# Patient Record
Sex: FEMALE | Race: ASIAN
[De-identification: names, ages, dates, MRNs, and addresses within clinical notes are randomized per-mention and may not be internally consistent; named-entity substitution may affect disease eponyms.]

---

## 2021-09-27 PROBLEM — Z00.00 ENCOUNTER FOR PREVENTIVE HEALTH EXAMINATION: Status: ACTIVE | Noted: 2021-09-27

## 2021-10-14 ENCOUNTER — APPOINTMENT (OUTPATIENT)
Dept: OBGYN | Facility: CLINIC | Age: 26
End: 2021-10-14
Payer: COMMERCIAL

## 2021-10-14 VITALS
SYSTOLIC BLOOD PRESSURE: 115 MMHG | OXYGEN SATURATION: 98 % | WEIGHT: 140 LBS | HEIGHT: 62 IN | HEART RATE: 81 BPM | TEMPERATURE: 97.9 F | BODY MASS INDEX: 25.76 KG/M2 | DIASTOLIC BLOOD PRESSURE: 71 MMHG

## 2021-10-14 DIAGNOSIS — Z78.9 OTHER SPECIFIED HEALTH STATUS: ICD-10-CM

## 2021-10-14 DIAGNOSIS — R10.2 PELVIC AND PERINEAL PAIN: ICD-10-CM

## 2021-10-14 DIAGNOSIS — N89.8 OTHER SPECIFIED NONINFLAMMATORY DISORDERS OF VAGINA: ICD-10-CM

## 2021-10-14 DIAGNOSIS — Z83.3 FAMILY HISTORY OF DIABETES MELLITUS: ICD-10-CM

## 2021-10-14 PROCEDURE — 99204 OFFICE O/P NEW MOD 45 MIN: CPT

## 2021-10-14 RX ORDER — DOXYCYCLINE HYCLATE 100 MG/1
100 TABLET ORAL
Qty: 14 | Refills: 0 | Status: ACTIVE | COMMUNITY
Start: 2021-10-14 | End: 1900-01-01

## 2021-10-14 NOTE — HISTORY OF PRESENT ILLNESS
[Normal Amount/Duration] :  normal amount and duration [Frequency: Q ___ days] : menstrual periods occur approximately every [unfilled] days [Menarche Age: ____] : age at menarche was [unfilled] [Menstrual Cramps] : menstrual cramps [FreeTextEntry1] : 10/14/2021 [Currently Active] : currently active [Men] : men [Vaginal] : vaginal [No] : No

## 2021-10-14 NOTE — PHYSICAL EXAM
[Appropriately responsive] : appropriately responsive [Alert] : alert [No Acute Distress] : no acute distress [No Lymphadenopathy] : no lymphadenopathy [Regular Rate Rhythm] : regular rate rhythm [No Murmurs] : no murmurs [Clear to Auscultation B/L] : clear to auscultation bilaterally [Soft] : soft [Non-tender] : non-tender [Non-distended] : non-distended [No HSM] : No HSM [No Lesions] : no lesions [No Mass] : no mass [Oriented x3] : oriented x3 [Examination Of The Breasts] : a normal appearance [No Masses] : no breast masses were palpable [Labia Majora] : normal [Labia Minora] : normal [Discharge] : discharge [Moderate] : moderate [Foul Smelling] : foul smelling [White] : white [Thick] : thick [Blood-Tinged] : blood-tinged [Normal] : normal [Uterine Adnexae] : normal

## 2021-10-15 LAB
APPEARANCE: CLEAR
BACTERIA: NEGATIVE
BASOPHILS # BLD AUTO: 0.02 K/UL
BASOPHILS NFR BLD AUTO: 0.3 %
BILIRUBIN URINE: NEGATIVE
BLOOD URINE: NEGATIVE
C TRACH RRNA SPEC QL NAA+PROBE: NOT DETECTED
COLOR: COLORLESS
EOSINOPHIL # BLD AUTO: 0.05 K/UL
EOSINOPHIL NFR BLD AUTO: 0.7 %
GLUCOSE QUALITATIVE U: NEGATIVE
HCT VFR BLD CALC: 38.6 %
HGB BLD-MCNC: 12.2 G/DL
HIV1+2 AB SPEC QL IA.RAPID: NONREACTIVE
HSV 1+2 IGG SER IA-IMP: NEGATIVE
HSV 1+2 IGG SER IA-IMP: POSITIVE
HSV1 IGG SER QL: 55.2 INDEX
HSV2 IGG SER QL: 0.09 INDEX
HYALINE CASTS: 0 /LPF
IMM GRANULOCYTES NFR BLD AUTO: 0.3 %
KETONES URINE: NEGATIVE
LEUKOCYTE ESTERASE URINE: NEGATIVE
LYMPHOCYTES # BLD AUTO: 2.56 K/UL
LYMPHOCYTES NFR BLD AUTO: 35.5 %
MAN DIFF?: NORMAL
MCHC RBC-ENTMCNC: 28 PG
MCHC RBC-ENTMCNC: 31.6 GM/DL
MCV RBC AUTO: 88.5 FL
MICROSCOPIC-UA: NORMAL
MONOCYTES # BLD AUTO: 0.28 K/UL
MONOCYTES NFR BLD AUTO: 3.9 %
N GONORRHOEA RRNA SPEC QL NAA+PROBE: NOT DETECTED
NEUTROPHILS # BLD AUTO: 4.29 K/UL
NEUTROPHILS NFR BLD AUTO: 59.3 %
NITRITE URINE: NEGATIVE
PH URINE: 6.5
PLATELET # BLD AUTO: 263 K/UL
PROTEIN URINE: NEGATIVE
RBC # BLD: 4.36 M/UL
RBC # FLD: 14.1 %
RED BLOOD CELLS URINE: 2 /HPF
SOURCE TP AMPLIFICATION: NORMAL
SPECIFIC GRAVITY URINE: 1.01
SQUAMOUS EPITHELIAL CELLS: 1 /HPF
T PALLIDUM AB SER QL IA: NEGATIVE
UROBILINOGEN URINE: NORMAL
WBC # FLD AUTO: 7.22 K/UL
WHITE BLOOD CELLS URINE: 0 /HPF

## 2021-10-16 LAB
BACTERIA UR CULT: NORMAL
CANDIDA VAG CYTO: NOT DETECTED
G VAGINALIS+PREV SP MTYP VAG QL MICRO: NOT DETECTED
T VAGINALIS VAG QL WET PREP: NOT DETECTED

## 2021-10-18 RX ORDER — ERGOCALCIFEROL 1.25 MG/1
1.25 MG CAPSULE, LIQUID FILLED ORAL
Qty: 4 | Refills: 0 | Status: ACTIVE | COMMUNITY
Start: 2021-09-24

## 2021-10-18 RX ORDER — CHLORHEXIDINE GLUCONATE, 0.12% ORAL RINSE 1.2 MG/ML
0.12 SOLUTION DENTAL
Qty: 473 | Refills: 0 | Status: COMPLETED | COMMUNITY
Start: 2021-05-31

## 2021-10-18 RX ORDER — IBUPROFEN 600 MG/1
600 TABLET, FILM COATED ORAL
Qty: 28 | Refills: 0 | Status: COMPLETED | COMMUNITY
Start: 2021-05-31

## 2021-10-18 RX ORDER — AMOXICILLIN 500 MG/1
500 CAPSULE ORAL
Qty: 21 | Refills: 0 | Status: COMPLETED | COMMUNITY
Start: 2021-05-31

## 2021-10-19 DIAGNOSIS — B37.3 CANDIDIASIS OF VULVA AND VAGINA: ICD-10-CM

## 2021-10-19 LAB — CYTOLOGY CVX/VAG DOC THIN PREP: ABNORMAL

## 2021-10-19 RX ORDER — FLUCONAZOLE 150 MG/1
150 TABLET ORAL
Qty: 2 | Refills: 1 | Status: ACTIVE | COMMUNITY
Start: 2021-10-19 | End: 1900-01-01

## 2021-10-21 ENCOUNTER — APPOINTMENT (OUTPATIENT)
Dept: OBGYN | Facility: CLINIC | Age: 26
End: 2021-10-21

## 2024-04-25 ENCOUNTER — APPOINTMENT (OUTPATIENT)
Dept: ANTEPARTUM | Facility: CLINIC | Age: 29
End: 2024-04-25
Payer: COMMERCIAL

## 2024-04-25 ENCOUNTER — TRANSCRIPTION ENCOUNTER (OUTPATIENT)
Age: 29
End: 2024-04-25

## 2024-04-25 ENCOUNTER — ASOB RESULT (OUTPATIENT)
Age: 29
End: 2024-04-25

## 2024-04-25 PROCEDURE — 76819 FETAL BIOPHYS PROFIL W/O NST: CPT

## 2024-04-25 PROCEDURE — 76820 UMBILICAL ARTERY ECHO: CPT

## 2024-04-25 PROCEDURE — 99204 OFFICE O/P NEW MOD 45 MIN: CPT | Mod: 25

## 2024-04-25 PROCEDURE — 76805 OB US >/= 14 WKS SNGL FETUS: CPT

## 2024-05-02 ENCOUNTER — APPOINTMENT (OUTPATIENT)
Dept: ANTEPARTUM | Facility: CLINIC | Age: 29
End: 2024-05-02
Payer: COMMERCIAL

## 2024-05-02 ENCOUNTER — ASOB RESULT (OUTPATIENT)
Age: 29
End: 2024-05-02

## 2024-05-02 PROCEDURE — 99212 OFFICE O/P EST SF 10 MIN: CPT | Mod: 25

## 2024-05-02 PROCEDURE — 76820 UMBILICAL ARTERY ECHO: CPT

## 2024-05-02 PROCEDURE — 76819 FETAL BIOPHYS PROFIL W/O NST: CPT

## 2024-05-03 ENCOUNTER — APPOINTMENT (OUTPATIENT)
Dept: MATERNAL FETAL MEDICINE | Facility: CLINIC | Age: 29
End: 2024-05-03
Payer: COMMERCIAL

## 2024-05-03 ENCOUNTER — ASOB RESULT (OUTPATIENT)
Age: 29
End: 2024-05-03

## 2024-05-03 DIAGNOSIS — O24.419 GESTATIONAL DIABETES MELLITUS IN PREGNANCY, UNSPECIFIED CONTROL: ICD-10-CM

## 2024-05-03 PROCEDURE — G0109 DIAB MANAGE TRN IND/GROUP: CPT | Mod: 95

## 2024-05-03 RX ORDER — INSULIN HUMAN 100 [IU]/ML
100 INJECTION, SUSPENSION SUBCUTANEOUS
Qty: 1 | Refills: 2 | Status: ACTIVE | COMMUNITY
Start: 2024-05-03 | End: 1900-01-01

## 2024-05-03 RX ORDER — URINE ACETONE TEST STRIPS
STRIP MISCELLANEOUS
Qty: 1 | Refills: 2 | Status: ACTIVE | COMMUNITY
Start: 2024-05-03 | End: 1900-01-01

## 2024-05-03 RX ORDER — ISOPROPYL ALCOHOL 0.7 ML/ML
SWAB TOPICAL
Qty: 1 | Refills: 2 | Status: ACTIVE | COMMUNITY
Start: 2024-05-03 | End: 1900-01-01

## 2024-05-03 RX ORDER — PEN NEEDLE, DIABETIC 32GX 5/32"
32G X 4 MM NEEDLE, DISPOSABLE MISCELLANEOUS
Qty: 1 | Refills: 0 | Status: ACTIVE | COMMUNITY
Start: 2024-05-03 | End: 1900-01-01

## 2024-05-09 ENCOUNTER — APPOINTMENT (OUTPATIENT)
Dept: ANTEPARTUM | Facility: CLINIC | Age: 29
End: 2024-05-09
Payer: COMMERCIAL

## 2024-05-09 ENCOUNTER — ASOB RESULT (OUTPATIENT)
Age: 29
End: 2024-05-09

## 2024-05-09 PROCEDURE — 76820 UMBILICAL ARTERY ECHO: CPT

## 2024-05-09 PROCEDURE — 76819 FETAL BIOPHYS PROFIL W/O NST: CPT

## 2024-05-13 ENCOUNTER — APPOINTMENT (OUTPATIENT)
Dept: MATERNAL FETAL MEDICINE | Facility: CLINIC | Age: 29
End: 2024-05-13
Payer: COMMERCIAL

## 2024-05-13 ENCOUNTER — ASOB RESULT (OUTPATIENT)
Age: 29
End: 2024-05-13

## 2024-05-13 PROCEDURE — G0108 DIAB MANAGE TRN  PER INDIV: CPT | Mod: 95

## 2024-05-13 RX ORDER — LANCETS 33 GAUGE
EACH MISCELLANEOUS
Qty: 4 | Refills: 2 | Status: ACTIVE | COMMUNITY
Start: 2024-05-13 | End: 1900-01-01

## 2024-05-13 RX ORDER — BLOOD-GLUCOSE METER
KIT MISCELLANEOUS
Qty: 2 | Refills: 2 | Status: ACTIVE | COMMUNITY
Start: 2024-05-13 | End: 1900-01-01

## 2024-05-16 ENCOUNTER — ASOB RESULT (OUTPATIENT)
Age: 29
End: 2024-05-16

## 2024-05-16 ENCOUNTER — APPOINTMENT (OUTPATIENT)
Dept: ANTEPARTUM | Facility: CLINIC | Age: 29
End: 2024-05-16

## 2024-05-16 ENCOUNTER — APPOINTMENT (OUTPATIENT)
Dept: ANTEPARTUM | Facility: CLINIC | Age: 29
End: 2024-05-16
Payer: COMMERCIAL

## 2024-05-16 ENCOUNTER — OUTPATIENT (OUTPATIENT)
Dept: INPATIENT UNIT | Facility: HOSPITAL | Age: 29
LOS: 1 days | Discharge: HOME | End: 2024-05-16
Payer: COMMERCIAL

## 2024-05-16 VITALS — SYSTOLIC BLOOD PRESSURE: 124 MMHG | HEART RATE: 70 BPM | DIASTOLIC BLOOD PRESSURE: 62 MMHG

## 2024-05-16 VITALS
SYSTOLIC BLOOD PRESSURE: 134 MMHG | DIASTOLIC BLOOD PRESSURE: 60 MMHG | HEART RATE: 86 BPM | TEMPERATURE: 98 F | RESPIRATION RATE: 16 BRPM

## 2024-05-16 DIAGNOSIS — O26.899 OTHER SPECIFIED PREGNANCY RELATED CONDITIONS, UNSPECIFIED TRIMESTER: ICD-10-CM

## 2024-05-16 PROCEDURE — 99212 OFFICE O/P EST SF 10 MIN: CPT | Mod: 25

## 2024-05-16 PROCEDURE — 76818 FETAL BIOPHYS PROFILE W/NST: CPT | Mod: 59

## 2024-05-16 PROCEDURE — 59025 FETAL NON-STRESS TEST: CPT | Mod: 26

## 2024-05-16 PROCEDURE — 99221 1ST HOSP IP/OBS SF/LOW 40: CPT | Mod: 25

## 2024-05-16 PROCEDURE — 76816 OB US FOLLOW-UP PER FETUS: CPT

## 2024-05-16 NOTE — OB PROVIDER TRIAGE NOTE - HISTORY OF PRESENT ILLNESS
28 year old female P0 at 34.2 week gestationGDMA2   who was sent from ATU for NR NST   followed for Fetal growth EFW 3%      [facility]     followed for FGR   EFW 3%  4.7# on today scan   BPP 8/  28 year old female P0 at 34.2 week gestation GDMA2   who was sent from ATU for NR NST         [facility]     followed for FGR   EFW 3%  4.7# on today scan   BPP 8/

## 2024-05-16 NOTE — OB PROVIDER TRIAGE NOTE - NSOBPROVIDERNOTE_OBGYN_ALL_OB_FT
28 year old female  NRNST at ATU    [facility]   [facility]   NST x 2 hours    cleared for discharge    instructions  verbal and written given to patient     - Discussed with Dr. Laureano   - Patient to be discharged home with follow up and return precautions  - Please follow up with your obstetrician at your next scheduled appointment.   - Please return for decreased/no fetal movement, vaginal bleeding similar to that of a period, leaking/gush of fluid, regular contractions occurring 4-5 minutes for one hour or requiring pain medication   - Patient and partner and educated of plan and demonstrate understanding. All questions answered. Discharge instructions provided and signed.   - Discharged at ___[id number]

## 2024-05-16 NOTE — OB PROVIDER TRIAGE NOTE - NSHPPHYSICALEXAM_GEN_ALL_CORE
pt seen and examined   ICU Vital Signs Last 24 Hrs  T(C): 36.7 (16 May 2024 11:01), Max: 36.7 (16 May 2024 10:43)  T(F): 98.06 (16 May 2024 11:01), Max: 98.1 (16 May 2024 10:43)  HR: 70 (16 May 2024 13:33) (70 - 86)  BP: 124/62 (16 May 2024 13:33) (108/57 - 134/60)  BP(mean): --  ABP: --  ABP(mean): --  RR: 16 (16 May 2024 11:01) (16 - 16)  SpO2: --    pt in NAD      lungs clear    heart s1 s2   abd  soft gravid  non tender   placed on EFM      NST reactive  in traige     scan at [facility]   FGR  EFW 4.7#  3%  AAFI 7.7 pt seen and examined   ICU Vital Signs Last 24 Hrs  T(C): 36.7 (16 May 2024 11:01), Max: 36.7 (16 May 2024 10:43)  T(F): 98.06 (16 May 2024 11:01), Max: 98.1 (16 May 2024 10:43)  HR: 70 (16 May 2024 13:33) (70 - 86)  BP: 124/62 (16 May 2024 13:33) (108/57 - 134/60)  BP(mean): --  ABP: --  ABP(mean): --  RR: 16 (16 May 2024 11:01) (16 - 16)  SpO2: --    pt in NAD      lungs clear    heart s1 s2   abd  soft gravid  non tender   placed on EFM      NST reactive  in traige     scan at ATU   today   BREECH   FGR  EFW 4.7#  3%  AAFI 7.7

## 2024-05-17 DIAGNOSIS — O24.415 GESTATIONAL DIABETES MELLITUS IN PREGNANCY, CONTROLLED BY ORAL HYPOGLYCEMIC DRUGS: ICD-10-CM

## 2024-05-17 DIAGNOSIS — Z3A.34 34 WEEKS GESTATION OF PREGNANCY: ICD-10-CM

## 2024-05-17 DIAGNOSIS — O36.5930 MATERNAL CARE FOR OTHER KNOWN OR SUSPECTED POOR FETAL GROWTH, THIRD TRIMESTER, NOT APPLICABLE OR UNSPECIFIED: ICD-10-CM

## 2024-05-22 ENCOUNTER — APPOINTMENT (OUTPATIENT)
Dept: MATERNAL FETAL MEDICINE | Facility: CLINIC | Age: 29
End: 2024-05-22
Payer: COMMERCIAL

## 2024-05-22 ENCOUNTER — ASOB RESULT (OUTPATIENT)
Age: 29
End: 2024-05-22

## 2024-05-22 PROCEDURE — G0108 DIAB MANAGE TRN  PER INDIV: CPT | Mod: 95

## 2024-05-23 ENCOUNTER — ASOB RESULT (OUTPATIENT)
Age: 29
End: 2024-05-23

## 2024-05-23 ENCOUNTER — APPOINTMENT (OUTPATIENT)
Dept: ANTEPARTUM | Facility: CLINIC | Age: 29
End: 2024-05-23
Payer: COMMERCIAL

## 2024-05-23 PROCEDURE — 76818 FETAL BIOPHYS PROFILE W/NST: CPT

## 2024-05-23 PROCEDURE — 76820 UMBILICAL ARTERY ECHO: CPT

## 2024-05-30 ENCOUNTER — APPOINTMENT (OUTPATIENT)
Dept: ANTEPARTUM | Facility: CLINIC | Age: 29
End: 2024-05-30

## 2024-05-30 ENCOUNTER — ASOB RESULT (OUTPATIENT)
Age: 29
End: 2024-05-30

## 2024-05-30 PROCEDURE — 76818 FETAL BIOPHYS PROFILE W/NST: CPT

## 2024-05-30 PROCEDURE — 76820 UMBILICAL ARTERY ECHO: CPT | Mod: 59

## 2024-05-30 PROCEDURE — 99212 OFFICE O/P EST SF 10 MIN: CPT | Mod: 25

## 2024-05-30 PROCEDURE — 76816 OB US FOLLOW-UP PER FETUS: CPT

## 2024-06-04 ENCOUNTER — APPOINTMENT (OUTPATIENT)
Dept: ANTEPARTUM | Facility: CLINIC | Age: 29
End: 2024-06-04
Payer: COMMERCIAL

## 2024-06-04 ENCOUNTER — ASOB RESULT (OUTPATIENT)
Age: 29
End: 2024-06-04

## 2024-06-04 PROCEDURE — 76820 UMBILICAL ARTERY ECHO: CPT

## 2024-06-04 PROCEDURE — 76818 FETAL BIOPHYS PROFILE W/NST: CPT

## 2024-06-05 ENCOUNTER — TRANSCRIPTION ENCOUNTER (OUTPATIENT)
Age: 29
End: 2024-06-05

## 2024-06-05 ENCOUNTER — OUTPATIENT (OUTPATIENT)
Dept: OUTPATIENT SERVICES | Facility: HOSPITAL | Age: 29
LOS: 1 days | End: 2024-06-05

## 2024-06-05 VITALS
RESPIRATION RATE: 16 BRPM | SYSTOLIC BLOOD PRESSURE: 96 MMHG | DIASTOLIC BLOOD PRESSURE: 66 MMHG | HEART RATE: 90 BPM | OXYGEN SATURATION: 99 % | TEMPERATURE: 98 F | HEIGHT: 62 IN | WEIGHT: 169.98 LBS

## 2024-06-05 LAB
ANION GAP SERPL CALC-SCNC: 16 MMOL/L — HIGH (ref 7–14)
APPEARANCE UR: CLEAR — SIGNIFICANT CHANGE UP
BILIRUB UR-MCNC: NEGATIVE — SIGNIFICANT CHANGE UP
BLD GP AB SCN SERPL QL: NEGATIVE — SIGNIFICANT CHANGE UP
BUN SERPL-MCNC: 12 MG/DL — SIGNIFICANT CHANGE UP (ref 7–23)
CALCIUM SERPL-MCNC: 9.1 MG/DL — SIGNIFICANT CHANGE UP (ref 8.4–10.5)
CHLORIDE SERPL-SCNC: 104 MMOL/L — SIGNIFICANT CHANGE UP (ref 98–107)
CO2 SERPL-SCNC: 17 MMOL/L — LOW (ref 22–31)
COLOR SPEC: YELLOW — SIGNIFICANT CHANGE UP
CREAT SERPL-MCNC: 0.54 MG/DL — SIGNIFICANT CHANGE UP (ref 0.5–1.3)
DIFF PNL FLD: NEGATIVE — SIGNIFICANT CHANGE UP
EGFR: 130 ML/MIN/1.73M2 — SIGNIFICANT CHANGE UP
GLUCOSE SERPL-MCNC: 74 MG/DL — SIGNIFICANT CHANGE UP (ref 70–99)
GLUCOSE UR QL: NEGATIVE MG/DL — SIGNIFICANT CHANGE UP
HCT VFR BLD CALC: 35.8 % — SIGNIFICANT CHANGE UP (ref 34.5–45)
HGB BLD-MCNC: 11.6 G/DL — SIGNIFICANT CHANGE UP (ref 11.5–15.5)
KETONES UR-MCNC: NEGATIVE MG/DL — SIGNIFICANT CHANGE UP
LEUKOCYTE ESTERASE UR-ACNC: NEGATIVE — SIGNIFICANT CHANGE UP
MCHC RBC-ENTMCNC: 27 PG — SIGNIFICANT CHANGE UP (ref 27–34)
MCHC RBC-ENTMCNC: 32.4 GM/DL — SIGNIFICANT CHANGE UP (ref 32–36)
MCV RBC AUTO: 83.4 FL — SIGNIFICANT CHANGE UP (ref 80–100)
NITRITE UR-MCNC: NEGATIVE — SIGNIFICANT CHANGE UP
NRBC # BLD: 0 /100 WBCS — SIGNIFICANT CHANGE UP (ref 0–0)
NRBC # FLD: 0 K/UL — SIGNIFICANT CHANGE UP (ref 0–0)
PH UR: 6.5 — SIGNIFICANT CHANGE UP (ref 5–8)
PLATELET # BLD AUTO: 179 K/UL — SIGNIFICANT CHANGE UP (ref 150–400)
POTASSIUM SERPL-MCNC: 4.1 MMOL/L — SIGNIFICANT CHANGE UP (ref 3.5–5.3)
POTASSIUM SERPL-SCNC: 4.1 MMOL/L — SIGNIFICANT CHANGE UP (ref 3.5–5.3)
PROT UR-MCNC: NEGATIVE MG/DL — SIGNIFICANT CHANGE UP
RBC # BLD: 4.29 M/UL — SIGNIFICANT CHANGE UP (ref 3.8–5.2)
RBC # FLD: 14.8 % — HIGH (ref 10.3–14.5)
RH IG SCN BLD-IMP: POSITIVE — SIGNIFICANT CHANGE UP
SODIUM SERPL-SCNC: 137 MMOL/L — SIGNIFICANT CHANGE UP (ref 135–145)
SP GR SPEC: 1.01 — SIGNIFICANT CHANGE UP (ref 1–1.03)
UROBILINOGEN FLD QL: 0.2 MG/DL — SIGNIFICANT CHANGE UP (ref 0.2–1)
WBC # BLD: 7.88 K/UL — SIGNIFICANT CHANGE UP (ref 3.8–10.5)
WBC # FLD AUTO: 7.88 K/UL — SIGNIFICANT CHANGE UP (ref 3.8–10.5)

## 2024-06-05 RX ORDER — CHLORHEXIDINE GLUCONATE 213 G/1000ML
1 SOLUTION TOPICAL DAILY
Refills: 0 | Status: DISCONTINUED | OUTPATIENT
Start: 2024-06-06 | End: 2024-06-09

## 2024-06-05 RX ORDER — SODIUM CHLORIDE 9 MG/ML
1000 INJECTION, SOLUTION INTRAVENOUS
Refills: 0 | Status: DISCONTINUED | OUTPATIENT
Start: 2024-06-06 | End: 2024-06-08

## 2024-06-05 RX ORDER — CITRIC ACID/SODIUM CITRATE 300-500 MG
30 SOLUTION, ORAL ORAL ONCE
Refills: 0 | Status: COMPLETED | OUTPATIENT
Start: 2024-06-06 | End: 2024-06-06

## 2024-06-05 NOTE — OB PST NOTE - PROBLEM SELECTOR PLAN 1
Pre-op instructions provided. Pt given verbal and written instructions with teach back on chlorhexidine shampoo, and anesthesia. Pt verbalized understanding with return demonstration.     CBC, BMP (GDM) T/S and UA done at PST.  Pt advised  to follow OB for insulin instruction.  Next day case, discussed to . Patient is scheduled for C section on 6/6/2024.Pre-op instructions provided. Pt given verbal and written instructions with teach back on chlorhexidine shampoo, and anesthesia. Pt verbalized understanding with return demonstration.     CBC, BMP (GDM) T/S and UA done at [facility].  Pt advised  to follow OB for insulin instruction.  Next day case, discussed to .

## 2024-06-05 NOTE — OB PST NOTE - HISTORY OF PRESENT ILLNESS
28 year old pregnant female presents to presurgical testing scheduled for Caesarean section due to Breech position. Patient denies vaginal  bleeding, spotting or leakage of amniotic fluid. Patient denies regular contractions. Patient reports positive fetal movement.

## 2024-06-05 NOTE — OB PST NOTE - NSICDXPASTMEDICALHX_GEN_ALL_CORE_FT
PAST MEDICAL HISTORY:  Gestational diabetes     Maternal care for breech presentation, not applicable or unspecified

## 2024-06-05 NOTE — OB PST NOTE - NSHPREVIEWOFSYSTEMS_GEN_ALL_CORE
General: no fever, chills, sweating, anorexia, or weight loss    Skin: no rashes, itching, or dryness    Breast: no tenderness, lumps, or nipple discharge    Opthalmologic: no diplopia, photophobia, or blurred vision    ENMT: no hearing difficulty, ear pain, tinnitus, or vertigo. No sinus symptoms, nasal congestion, nasal discharge, or nasal obstruction    Respiratory and Thorax: no wheezing, dyspnea, or cough    Cardiovascular: no chest pain, palpitations, dyspnea on exertion, orthopnea, or peripheral edema    Gastrointestinal: no nausea, vomiting, diarrhea, constipation, change in bowel movements, or abdominal pain    Genitourinary and Pelvis: no hematuria, renal colic, flank pain, dysuria, nocturia. No abnormal vaginal bleeding, vaginal discharge, spotting, pelvic pain, or vaginal leakage    Musculoskeletal: no arthralgia, arthritis, joint swelling, muscle cramping, muscle weakness, neck pain, arm pain, or leg pain    Neurological: no transient paralysis, weakness, paresthesias, or seizures. No syncope, tremors, vertigo, loss of sensation, difficulty walking, loss of consciousness    Psychiatric: no suicidal ideation, depression, anxiety    Hematology: no nose bleeding, easy bruising or bleeding, or skin lumps    Lymphatic: no enlarged or tender lymph nodes, or extremity swelling    Endocrine: no heat or cold intolerance, Gestational diabetes, Insulin 24 unit at night    Immunologic: no recurrent or persistent infections

## 2024-06-06 ENCOUNTER — APPOINTMENT (OUTPATIENT)
Dept: ANTEPARTUM | Facility: CLINIC | Age: 29
End: 2024-06-06

## 2024-06-06 ENCOUNTER — INPATIENT (INPATIENT)
Facility: HOSPITAL | Age: 29
LOS: 3 days | Discharge: HOME | End: 2024-06-10
Attending: OBSTETRICS & GYNECOLOGY | Admitting: OBSTETRICS & GYNECOLOGY
Payer: COMMERCIAL

## 2024-06-06 VITALS — SYSTOLIC BLOOD PRESSURE: 115 MMHG | HEART RATE: 84 BPM | DIASTOLIC BLOOD PRESSURE: 67 MMHG

## 2024-06-06 LAB
BASOPHILS # BLD AUTO: 0.03 K/UL — SIGNIFICANT CHANGE UP (ref 0–0.2)
BASOPHILS NFR BLD AUTO: 0.3 % — SIGNIFICANT CHANGE UP (ref 0–2)
BLD GP AB SCN SERPL QL: NEGATIVE — SIGNIFICANT CHANGE UP
EOSINOPHIL # BLD AUTO: 0.02 K/UL — SIGNIFICANT CHANGE UP (ref 0–0.5)
EOSINOPHIL NFR BLD AUTO: 0.2 % — SIGNIFICANT CHANGE UP (ref 0–6)
GLUCOSE BLDC GLUCOMTR-MCNC: 80 MG/DL — SIGNIFICANT CHANGE UP (ref 70–99)
HBV SURFACE AG SERPL QL IA: SIGNIFICANT CHANGE UP
HCT VFR BLD CALC: 37.7 % — SIGNIFICANT CHANGE UP (ref 34.5–45)
HGB BLD-MCNC: 12 G/DL — SIGNIFICANT CHANGE UP (ref 11.5–15.5)
HIV 1+2 AB+HIV1 P24 AG SERPL QL IA: SIGNIFICANT CHANGE UP
IANC: 5.73 K/UL — SIGNIFICANT CHANGE UP (ref 1.8–7.4)
IMM GRANULOCYTES NFR BLD AUTO: 0.8 % — SIGNIFICANT CHANGE UP (ref 0–0.9)
LYMPHOCYTES # BLD AUTO: 2.34 K/UL — SIGNIFICANT CHANGE UP (ref 1–3.3)
LYMPHOCYTES # BLD AUTO: 27.1 % — SIGNIFICANT CHANGE UP (ref 13–44)
MCHC RBC-ENTMCNC: 26.7 PG — LOW (ref 27–34)
MCHC RBC-ENTMCNC: 31.8 GM/DL — LOW (ref 32–36)
MCV RBC AUTO: 84 FL — SIGNIFICANT CHANGE UP (ref 80–100)
MONOCYTES # BLD AUTO: 0.45 K/UL — SIGNIFICANT CHANGE UP (ref 0–0.9)
MONOCYTES NFR BLD AUTO: 5.2 % — SIGNIFICANT CHANGE UP (ref 2–14)
NEUTROPHILS # BLD AUTO: 5.73 K/UL — SIGNIFICANT CHANGE UP (ref 1.8–7.4)
NEUTROPHILS NFR BLD AUTO: 66.4 % — SIGNIFICANT CHANGE UP (ref 43–77)
NRBC # BLD: 0 /100 WBCS — SIGNIFICANT CHANGE UP (ref 0–0)
NRBC # FLD: 0 K/UL — SIGNIFICANT CHANGE UP (ref 0–0)
PLATELET # BLD AUTO: 181 K/UL — SIGNIFICANT CHANGE UP (ref 150–400)
RBC # BLD: 4.49 M/UL — SIGNIFICANT CHANGE UP (ref 3.8–5.2)
RBC # FLD: 15.1 % — HIGH (ref 10.3–14.5)
RH IG SCN BLD-IMP: POSITIVE — SIGNIFICANT CHANGE UP
RUBV IGG SER-ACNC: 4.1 INDEX — SIGNIFICANT CHANGE UP
RUBV IGG SER-IMP: POSITIVE — SIGNIFICANT CHANGE UP
T PALLIDUM AB TITR SER: NEGATIVE — SIGNIFICANT CHANGE UP
WBC # BLD: 8.64 K/UL — SIGNIFICANT CHANGE UP (ref 3.8–10.5)
WBC # FLD AUTO: 8.64 K/UL — SIGNIFICANT CHANGE UP (ref 3.8–10.5)

## 2024-06-06 PROCEDURE — 88307 TISSUE EXAM BY PATHOLOGIST: CPT | Mod: 26

## 2024-06-06 RX ORDER — MORPHINE SULFATE 50 MG/1
0.1 CAPSULE, EXTENDED RELEASE ORAL ONCE
Refills: 0 | Status: DISCONTINUED | OUTPATIENT
Start: 2024-06-06 | End: 2024-06-09

## 2024-06-06 RX ORDER — DIPHENHYDRAMINE HCL 50 MG
25 CAPSULE ORAL EVERY 6 HOURS
Refills: 0 | Status: DISCONTINUED | OUTPATIENT
Start: 2024-06-06 | End: 2024-06-10

## 2024-06-06 RX ORDER — DEXAMETHASONE 0.5 MG/5ML
4 ELIXIR ORAL EVERY 6 HOURS
Refills: 0 | Status: DISCONTINUED | OUTPATIENT
Start: 2024-06-06 | End: 2024-06-09

## 2024-06-06 RX ORDER — OXYCODONE HYDROCHLORIDE 5 MG/1
5 TABLET ORAL
Refills: 0 | Status: COMPLETED | OUTPATIENT
Start: 2024-06-06 | End: 2024-06-13

## 2024-06-06 RX ORDER — SODIUM CHLORIDE 9 MG/ML
1000 INJECTION, SOLUTION INTRAVENOUS
Refills: 0 | Status: DISCONTINUED | OUTPATIENT
Start: 2024-06-06 | End: 2024-06-08

## 2024-06-06 RX ORDER — NALOXONE HYDROCHLORIDE 4 MG/.1ML
0.1 SPRAY NASAL
Refills: 0 | Status: DISCONTINUED | OUTPATIENT
Start: 2024-06-06 | End: 2024-06-09

## 2024-06-06 RX ORDER — ACETAMINOPHEN 500 MG
975 TABLET ORAL
Refills: 0 | Status: DISCONTINUED | OUTPATIENT
Start: 2024-06-06 | End: 2024-06-10

## 2024-06-06 RX ORDER — SODIUM CHLORIDE 9 MG/ML
1000 INJECTION, SOLUTION INTRAVENOUS
Refills: 0 | Status: DISCONTINUED | OUTPATIENT
Start: 2024-06-06 | End: 2024-06-09

## 2024-06-06 RX ORDER — OXYCODONE HYDROCHLORIDE 5 MG/1
5 TABLET ORAL
Refills: 0 | Status: DISCONTINUED | OUTPATIENT
Start: 2024-06-06 | End: 2024-06-07

## 2024-06-06 RX ORDER — IBUPROFEN 200 MG
600 TABLET ORAL EVERY 6 HOURS
Refills: 0 | Status: COMPLETED | OUTPATIENT
Start: 2024-06-06 | End: 2025-05-05

## 2024-06-06 RX ORDER — TETANUS TOXOID, REDUCED DIPHTHERIA TOXOID AND ACELLULAR PERTUSSIS VACCINE, ADSORBED 5; 2.5; 8; 8; 2.5 [IU]/.5ML; [IU]/.5ML; UG/.5ML; UG/.5ML; UG/.5ML
0.5 SUSPENSION INTRAMUSCULAR ONCE
Refills: 0 | Status: DISCONTINUED | OUTPATIENT
Start: 2024-06-06 | End: 2024-06-10

## 2024-06-06 RX ORDER — NALBUPHINE HYDROCHLORIDE 10 MG/ML
2.5 INJECTION, SOLUTION INTRAMUSCULAR; INTRAVENOUS; SUBCUTANEOUS EVERY 6 HOURS
Refills: 0 | Status: DISCONTINUED | OUTPATIENT
Start: 2024-06-06 | End: 2024-06-09

## 2024-06-06 RX ORDER — FAMOTIDINE 10 MG/ML
20 INJECTION INTRAVENOUS ONCE
Refills: 0 | Status: COMPLETED | OUTPATIENT
Start: 2024-06-06 | End: 2024-06-06

## 2024-06-06 RX ORDER — KETOROLAC TROMETHAMINE 30 MG/ML
30 SYRINGE (ML) INJECTION EVERY 6 HOURS
Refills: 0 | Status: DISCONTINUED | OUTPATIENT
Start: 2024-06-06 | End: 2024-06-07

## 2024-06-06 RX ORDER — OXYTOCIN 10 UNIT/ML
333.33 VIAL (ML) INJECTION
Qty: 20 | Refills: 0 | Status: DISCONTINUED | OUTPATIENT
Start: 2024-06-06 | End: 2024-06-09

## 2024-06-06 RX ORDER — ONDANSETRON 8 MG/1
4 TABLET, FILM COATED ORAL EVERY 6 HOURS
Refills: 0 | Status: DISCONTINUED | OUTPATIENT
Start: 2024-06-06 | End: 2024-06-09

## 2024-06-06 RX ORDER — LANOLIN
1 OINTMENT (GRAM) TOPICAL EVERY 6 HOURS
Refills: 0 | Status: DISCONTINUED | OUTPATIENT
Start: 2024-06-06 | End: 2024-06-10

## 2024-06-06 RX ORDER — HEPARIN SODIUM 5000 [USP'U]/ML
5000 INJECTION INTRAVENOUS; SUBCUTANEOUS EVERY 12 HOURS
Refills: 0 | Status: DISCONTINUED | OUTPATIENT
Start: 2024-06-06 | End: 2024-06-10

## 2024-06-06 RX ORDER — OXYCODONE HYDROCHLORIDE 5 MG/1
5 TABLET ORAL ONCE
Refills: 0 | Status: DISCONTINUED | OUTPATIENT
Start: 2024-06-06 | End: 2024-06-10

## 2024-06-06 RX ORDER — SIMETHICONE 80 MG/1
80 TABLET, CHEWABLE ORAL EVERY 4 HOURS
Refills: 0 | Status: DISCONTINUED | OUTPATIENT
Start: 2024-06-06 | End: 2024-06-10

## 2024-06-06 RX ORDER — MAGNESIUM HYDROXIDE 400 MG/1
30 TABLET, CHEWABLE ORAL
Refills: 0 | Status: DISCONTINUED | OUTPATIENT
Start: 2024-06-06 | End: 2024-06-10

## 2024-06-06 RX ADMIN — SODIUM CHLORIDE 75 MILLILITER(S): 9 INJECTION, SOLUTION INTRAVENOUS at 15:24

## 2024-06-06 RX ADMIN — Medication 975 MILLIGRAM(S): at 18:45

## 2024-06-06 RX ADMIN — Medication 30 MILLILITER(S): at 12:04

## 2024-06-06 RX ADMIN — Medication 1000 MILLIUNIT(S)/MIN: at 15:24

## 2024-06-06 RX ADMIN — Medication 30 MILLIGRAM(S): at 20:08

## 2024-06-06 RX ADMIN — Medication 975 MILLIGRAM(S): at 18:02

## 2024-06-06 RX ADMIN — SODIUM CHLORIDE 200 MILLILITER(S): 9 INJECTION, SOLUTION INTRAVENOUS at 12:05

## 2024-06-06 RX ADMIN — HEPARIN SODIUM 5000 UNIT(S): 5000 INJECTION INTRAVENOUS; SUBCUTANEOUS at 21:00

## 2024-06-06 RX ADMIN — CHLORHEXIDINE GLUCONATE 1 APPLICATION(S): 213 SOLUTION TOPICAL at 12:05

## 2024-06-06 RX ADMIN — NALBUPHINE HYDROCHLORIDE 2.5 MILLIGRAM(S): 10 INJECTION, SOLUTION INTRAMUSCULAR; INTRAVENOUS; SUBCUTANEOUS at 15:24

## 2024-06-06 RX ADMIN — FAMOTIDINE 20 MILLIGRAM(S): 10 INJECTION INTRAVENOUS at 12:05

## 2024-06-06 RX ADMIN — Medication 30 MILLIGRAM(S): at 20:20

## 2024-06-06 NOTE — OB PROVIDER H&P - NSLOWPPHRISK_OBGYN_A_OB
No previous uterine incision/[facility] Pregnancy/Less than or equal to 4 previous vaginal births/No known bleeding disorder/No history of postpartum hemorrhage/No other PPH risks indicated

## 2024-06-06 NOTE — LACTATION INITIAL EVALUATION - MILK SUPPLY
OFFICE VISIT    Patient: Giselle Parker   : 1973 MRN: [id number]    SUBJECTIVE:  Chief Complaint   Patient presents with   • Physical     CPE   • Md Thomas     Room 1, jaya hernandez       Patient has given consent to record this visit for documentation in their clinical record.    A 48 year old female presents for healthcare maintenance examination.        HISTORY OF PRESENT ILLNESS:  Routine general medical examination at a health care facility:  Immunization: Up-to-date.  Screening labs: Due. Last labs were done a year ago.    DENICE (generalized anxiety disorder):  Is on sertraline 25 mg, with benefits.     Panic attacks:  Is on sertraline 25 mg, with benefits.    Benign essential hypertension:  Is on propranolol 120 mg and half a tablet of chlorthalidone 25 mg. Requests for refill.    Migraine with aura and without status migrainosus, not intractable:  Reports having migraines with aura followed by vomiting and heat sensitivity. Takes tramadol 50 mg when the migraine is severe. Is not on any abortive medication as triptans do not work for her.    Pure hypercholesterolemia:  Is on crestor 20 mg, with benefits.    Chronic superficial gastritis without bleeding:  Is on Aciphex 20 mg once a day.     Sleep disturbance:  Is on Ambien 10 mg on an as needed basis.    Colon cancer screening:  Due.       PAST MEDICAL HISTORY:  Past Medical History:   Diagnosis Date   • Acute gastric ulcer with bleeding    • Anxiety    • Arthritis    • Benign essential hypertension    • Gary-Soulier heterozygosity (CMS/HCC)     sees hematologist   • Cellulitis    • Classic migraine    • Gastroesophageal reflux disease    • MI (myocardial infarction) (CMS/[facility])     patient denies   • Panic attacks    • Phlebitis and thrombophlebitis of lower extremities    • Transfusion history     last      MEDICATIONS:  Current Outpatient Medications   Medication Sig Dispense Refill   • sertraline (ZOLOFT) 25 MG tablet Take 1 tablet by mouth daily.  90 tablet 3   • losartan (COZAAR) 100 MG tablet Take 1 tablet by mouth daily. 90 tablet 3   • chlorthalidone (THALITONE) 25 MG tablet Take one-HALF tablet by mouth daily. 45 tablet 3   • rabeprazole (ACIPHEX) 20 MG tablet Take 1 tablet by mouth daily. 90 tablet 3   • rosuvastatin (CRESTOR) 20 MG tablet Take 1 tablet by mouth daily. 90 tablet 3   • propranolol (INDERAL LA) 120 MG 24 hr capsule Take 1 capsule by mouth daily. 90 capsule 3   • ALPRAZolam (XANAX) 0.5 MG tablet Take 1 tablet by mouth daily as needed (panic attack). 20 tablet 0   • zolpidem (Ambien) 10 MG tablet Take 1 tablet by mouth nightly as needed for Sleep. 30 tablet 0   • Ubrogepant (Ubrelvy) 50 MG Tab Take 50 mg by mouth daily as needed (Migraine.  May repeat once 2 hours later.). 10 tablet 2   • traMADol (ULTRAM) 50 MG tablet Take 1 tablet by mouth every 6 hours as needed for pain. 20 tablet 0   • docusate sodium-sennosides (SENOKOT S) 50-8.6 MG per tablet Take 1 tablet by mouth as needed.       No current facility-administered medications for this visit.     ALLERGIES:  Allergies as of 10/06/2022 - Reviewed 10/06/2022   Allergen Reaction Noted   • Desmopressin RASH 12/12/2018   • Latex SHORTNESS OF BREATH 11/19/2009   • Latex   (environmental) SHORTNESS OF BREATH 03/22/2013   • Morphine ANAPHYLAXIS    • Oxycodone SHORTNESS OF BREATH 02/12/2019   • Fluoxetine     • Influenza a     • Hydromorphone Nausea & Vomiting 07/05/2019   • Morphine sulfate Nausea & Vomiting 11/11/2009     FAMILY HISTORY:  Family History   Problem Relation Age of Onset   • Hypertension Mother    • Multiple Sclerosis Father    • Cancer, Lung Maternal Grandmother         Passed away from this at age 51.   • Cancer, Lung Maternal Grandfather         Passed away from this at age 82.   • Bleeding Disorder Paternal Grandmother         Passed away from this in her 60s. Gary Soulier Syndrome   • Cancer, Breast Maternal Aunt      SOCIAL HISTORY:  Social History     Tobacco Use   •  Smoking status: Former Smoker     Packs/day: 0.50     Types: Cigarettes     Quit date: 2017     Years since quittin.6   • Smokeless tobacco: Never Used   Vaping Use   • Vaping Use: never used   Substance Use Topics   • Alcohol use: No   • Drug use: No     Past Surgical HISTORY  Past Surgical History:   Procedure Laterality Date   • ------------other-------------  2019    Diagnostic laparoscopy   •  section, classic      x2   • Cyst removal      neck and shoulder   • Hysterectomy  2018    Partial; Robotic xl hysterectomy    • Hysteroscopy endometrial ablation     • Other surgical history      clot removal thigh and abdomen ( not phebitis); thigh age 20; abdomen    • Total hip arthroplasty Left 2021    Left Hip press-fit total hip replacement - Dr. Mack       REVIEW OF SYSTEMS:  Gastrointestinal: As Per HPI.   Psychiatric/Behavioral: As Per HPI.    OBJECTIVE:  Visit Vitals  /82   Pulse 84   Resp 16   Ht 5' 8\" (1.727 m)   Wt 92.9 kg (204 lb 14.4 oz)   LMP  (LMP Unknown) Comment: recent surgery dec 12 -18   BMI 31.15 kg/m²       PHYSICAL EXAM:  Constitutional: Obese, alert, in no acute distress and current vital signs reviewed.   Head and Face:atraumatic, no deformities, normocephalic, normal facies.   Eyes: no discharge, normal conjunctiva, no eyelid swelling, no ptosis and the sclerae were normal. Pupils equal, round and reactive to light and accommodation, conjugate gaze and extraocular movements were intact.   ENT: normal appearing outer ear, normal appearing nose. Examination of the tympanic membrane showed normal landmarks, normal appearing external canal. Nasal mucosa moist and pink, no nasal discharge. Normal lips. Oral mucosa pink and moist, no oral lesions.   Neck: normal appearing neck, supple neck and no mass was seen. Thyroid not enlarged and no thyroid nodules. No lymphadenopathy.   Pulmonary: no respiratory distress, normal respiratory rate and effort and  no accessory muscle use. Breath sounds clear to auscultation bilaterally.   Cardiovascular: normal rate, no murmurs were heard, regular rhythm, normal S1 and normal S2. Edema was not present in the lower extremities.   Abdomen: soft, nontender, nondistended, normal bowel sounds and no abdominal mass. No hepatomegaly and no splenomegaly. No umbilical hernia was discovered.   Musculoskeletal: normal gait. No musculoskeletal erythema was seen, no joint swelling seen, and no joint tenderness was elicited. No scoliosis. Normal range of motion. There was no joint instability noted. Muscle strength and tone were normal.   Neurologic: cranial nerves grossly intact. Normal DTRs. No sensory deficits noted. No coordination deficits.  Psychiatric: oriented to person, oriented to place and oriented to time. Alert and awake, interactive and mood/affect were appropriate. Judgment not impaired. Normal attention span. Short term memory intact.    Skin, Hair, Nails: normal skin color and pigmentation and no rash. No foot ulcers and no skin ulcer was seen. Normal skin turgor. No clubbing or cyanosis of the fingernails.      DIAGNOSTIC STUDIES:  LAB RESULTS:  Lab Services on 10/06/2022   Component Date Value Ref Range Status   • Fasting Status 10/06/2022 13  0 - 999 Hours Final   • Sodium 10/06/2022 138  135 - 145 mmol/L Final   • Potassium 10/06/2022 3.7  3.4 - 5.1 mmol/L Final   • Chloride 10/06/2022 101  97 - 110 mmol/L Final   • Carbon Dioxide 10/06/2022 30  21 - 32 mmol/L Final   • Anion Gap 10/06/2022 11  7 - 19 mmol/L Final   • Glucose 10/06/2022 99  70 - 99 mg/dL Final   • BUN 10/06/2022 12  6 - 20 mg/dL Final   • Creatinine 10/06/2022 0.96 (A) 0.51 - 0.95 mg/dL Final   • Glomerular Filtration Rate 10/06/2022 74  >=60 Final   • BUN/ Creatinine Ratio 10/06/2022 13  7 - 25 Final   • Calcium 10/06/2022 9.4  8.4 - 10.2 mg/dL Final   • Fasting Status 10/06/2022 13  0 - 999 Hours Final   • Cholesterol 10/06/2022 161  <=199 mg/dL  Final   • Triglycerides 10/06/2022 156 (A) <=149 mg/dL Final   • HDL 10/06/2022 39 (A) >=50 mg/dL Final   • LDL 10/06/2022 91  <=129 mg/dL Final   • Non-HDL Cholesterol 10/06/2022 122  mg/dL Final   • Cholesterol/ HDL Ratio 10/06/2022 4.1  <=4.4 Final   • Albumin 10/06/2022 4.0  3.6 - 5.1 g/dL Final   • Bilirubin, Total 10/06/2022 0.6  0.2 - 1.0 mg/dL Final   • Bilirubin, Direct 10/06/2022 0.1  0.0 - 0.2 mg/dL Final   • Alkaline Phosphatase 10/06/2022 40 (A) 45 - 117 Units/L Final   • GPT/ALT 10/06/2022 24  <64 Units/L Final   • GOT/AST 10/06/2022 18  <=37 Units/L Final   • Protein, Total 10/06/2022 7.4  6.4 - 8.2 g/dL Final   • Magnesium 10/06/2022 1.9  1.7 - 2.4 mg/dL Final       ASSESSMENT AND PLAN:  This 48 year old female presents with :  1. Routine general medical examination at a health care facility    2. DENICE (generalized anxiety disorder)    3. Panic attacks    4. Benign essential hypertension    5. Migraine with aura and without status migrainosus, not intractable    6. Pure hypercholesterolemia    7. Chronic superficial gastritis without bleeding    8. Sleep disturbance    9. Colon cancer screening        Orders Placed This Encounter   • Basic Metabolic Panel   • Lipid Panel With Reflex   • Hepatic Function Panel   • Magnesium   • Cologuard   • sertraline (ZOLOFT) 25 MG tablet   • losartan (COZAAR) 100 MG tablet   • chlorthalidone (THALITONE) 25 MG tablet   • rabeprazole (ACIPHEX) 20 MG tablet   • rosuvastatin (CRESTOR) 20 MG tablet   • propranolol (INDERAL LA) 120 MG 24 hr capsule   • ALPRAZolam (XANAX) 0.5 MG tablet   • zolpidem (Ambien) 10 MG tablet   • Ubrogepant (Ubrelvy) 50 MG Tab   • traMADol (ULTRAM) 50 MG tablet       PLAN:  Routine general medical examination at a health care facility:  Due for breast cancer screening and colon cancer screening. Deferred breast cancer screening at the time, Cologuard is ordered for her.  Immunizations are up-to-date.  Reviewed and discussed previous  reports.  Reviewed and reconciled the medication list.    DENICE (generalized anxiety disorder):  Controlled.  Continue sertraline 25 mg. Refill provided.     Panic attacks:  Controlled.  Continue sertraline 25 mg.  Refill for Xanax 0.5 mg provided. Advised to use sparingly.      Benign essential hypertension:  Controlled.  Continue with chlorthalidone 25 mg and propranolol 120 mg.  Ordered BMP.    Migraine with aura and without status migrainosus, not intractable:  Controlled.    Continue with propranolol 120 mg.  Will trial Ubrelvy 50 mg as an abortive agent as she does not tolerate triptans. Prescribed Ubrelvy 50 mg.  Refill provided for tramadol 50 mg.  Pure hypercholesterolemia:  Ordered lipid panel with reflex and hepatic function panel.  Continue Crestor 20 mg. Refill provided    Chronic superficial gastritis without bleeding:  Controlled.  Continue with Aciphex 20 mg. Refill provided.  Ordered magnesium.    Sleep disturbance:  Controlled.    Continue with Ambien 10 mg on an as needed basis. Advised to use sparingly. Refill provided.    Colon cancer screening:  Ordered Cologuard.    Return to clinic in one year for healthcare maintenance or sooner if needed.    Refer to orders.  Medical compliance with plan discussed and risks of non-compliance reviewed.  Patient education completed on disease process, etiology & prognosis.  Proper usage and side effects of medications reviewed & discussed.  Return to clinic as clinically indicated as discussed with the patient who verbalized understanding of the plan and is in agreement with the plan.    I,  Dr. Lisbeth Zimmerman, have created a visit summary document based on the audio recording between Dr. Roman Abrams MD and this patient for the physician to review, edit as needed, and authenticate.    Creation Date: 10/7/2022     The documentation recorded by the scribe accurately and completely reflects the service(s) I personally performed and the decisions made by me.    colostrum H Plasty Text: Given the location of the defect, shape of the defect and the proximity to free margins a H-plasty was deemed most appropriate for repair.  Using a sterile surgical marker, the appropriate advancement arms of the H-plasty were drawn incorporating the defect and placing the expected incisions within the relaxed skin tension lines where possible. The area thus outlined was incised deep to adipose tissue with a #15 scalpel blade. The skin margins were undermined to an appropriate distance in all directions utilizing iris scissors.  The opposing advancement arms were then advanced into place in opposite direction and anchored with interrupted buried subcutaneous sutures.

## 2024-06-06 NOTE — LACTATION INITIAL EVALUATION - LATCH: COMFORT (BREAST/NIPPLE) INFANT
Problem: Risk for Spread of Infection  Goal: Prevent transmission of infectious organism to others  Description: Prevent the transmission of infectious organisms to other patients, staff members, and visitors. Outcome: Progressing Towards Goal     Problem: Patient Education:  Go to Education Activity  Goal: Patient/Family Education  Outcome: Progressing Towards Goal     Problem: Diabetes Self-Management  Goal: *Disease process and treatment process  Description: Define diabetes and identify own type of diabetes; list 3 options for treating diabetes. Outcome: Progressing Towards Goal  Goal: *Incorporating nutritional management into lifestyle  Description: Describe effect of type, amount and timing of food on blood glucose; list 3 methods for planning meals. Outcome: Progressing Towards Goal  Goal: *Incorporating physical activity into lifestyle  Description: State effect of exercise on blood glucose levels. Outcome: Progressing Towards Goal  Goal: *Developing strategies to promote health/change behavior  Description: Define the ABC's of diabetes; identify appropriate screenings, schedule and personal plan for screenings. Outcome: Progressing Towards Goal  Goal: *Using medications safely  Description: State effect of diabetes medications on diabetes; name diabetes medication taking, action and side effects. Outcome: Progressing Towards Goal  Goal: *Monitoring blood glucose, interpreting and using results  Description: Identify recommended blood glucose targets  and personal targets. Outcome: Progressing Towards Goal  Goal: *Prevention, detection, treatment of acute complications  Description: List symptoms of hyper- and hypoglycemia; describe how to treat low blood sugar and actions for lowering  high blood glucose level.   Outcome: Progressing Towards Goal  Goal: *Prevention, detection and treatment of chronic complications  Description: Define the natural course of diabetes and describe the relationship of blood glucose levels to long term complications of diabetes. Outcome: Progressing Towards Goal  Goal: *Developing strategies to address psychosocial issues  Description: Describe feelings about living with diabetes; identify support needed and support network  Outcome: Progressing Towards Goal  Goal: *Insulin pump training  Outcome: Progressing Towards Goal  Goal: *Sick day guidelines  Outcome: Progressing Towards Goal  Goal: *Patient Specific Goal (EDIT GOAL, INSERT TEXT)  Outcome: Progressing Towards Goal     Problem: Patient Education: Go to Patient Education Activity  Goal: Patient/Family Education  Outcome: Progressing Towards Goal     Problem: Pressure Injury - Risk of  Goal: *Prevention of pressure injury  Description: Document Malik Scale and appropriate interventions in the flowsheet. Outcome: Progressing Towards Goal  Note: Pressure Injury Interventions:  Sensory Interventions: Assess changes in LOC    Moisture Interventions: Absorbent underpads    Activity Interventions: Increase time out of bed    Mobility Interventions: Float heels, HOB 30 degrees or less    Nutrition Interventions: Document food/fluid/supplement intake    Friction and Shear Interventions: Apply protective barrier, creams and emollients                Problem: Patient Education: Go to Patient Education Activity  Goal: Patient/Family Education  Outcome: Progressing Towards Goal     Problem: Airway Clearance - Ineffective  Goal: Achieve or maintain patent airway  Outcome: Progressing Towards Goal     Problem: Gas Exchange - Impaired  Goal: Absence of hypoxia  Outcome: Progressing Towards Goal  Goal: Promote optimal lung function  Outcome: Progressing Towards Goal     Problem: Breathing Pattern - Ineffective  Goal: Ability to achieve and maintain a regular respiratory rate  Outcome: Progressing Towards Goal     Problem:  Body Temperature -  Risk of, Imbalanced  Goal: Ability to maintain a body temperature within defined limits  Outcome: Progressing Towards Goal  Goal: Will regain or maintain usual level of consciousness  Outcome: Progressing Towards Goal  Goal: Complications related to the disease process, condition or treatment will be avoided or minimized  Outcome: Progressing Towards Goal     Problem: Isolation Precautions - Risk of Spread of Infection  Goal: Prevent transmission of infectious organism to others  Outcome: Progressing Towards Goal     Problem: Nutrition Deficits  Goal: Optimize nutrtional status  Outcome: Progressing Towards Goal     Problem: Risk for Fluid Volume Deficit  Goal: Maintain normal heart rhythm  Outcome: Progressing Towards Goal  Goal: Maintain absence of muscle cramping  Outcome: Progressing Towards Goal  Goal: Maintain normal serum potassium, sodium, calcium, phosphorus, and pH  Outcome: Progressing Towards Goal     Problem: Loneliness or Risk for Loneliness  Goal: Demonstrate positive use of time alone when socialization is not possible  Outcome: Progressing Towards Goal     Problem: Fatigue  Goal: Verbalize increase energy and improved vitality  Outcome: Progressing Towards Goal     Problem: Patient Education: Go to Patient Education Activity  Goal: Patient/Family Education  Outcome: Progressing Towards Goal     Problem: Falls - Risk of  Goal: *Absence of Falls  Description: Document Skyler Fall Risk and appropriate interventions in the flowsheet.   Outcome: Progressing Towards Goal  Note: Fall Risk Interventions:            Medication Interventions: Patient to call before getting OOB                   Problem: Patient Education: Go to Patient Education Activity  Goal: Patient/Family Education  Outcome: Progressing Towards Goal     Problem: Discharge Planning  Goal: *Discharge to safe environment  Outcome: Progressing Towards Goal  Goal: *Knowledge of medication management  Outcome: Progressing Towards Goal  Goal: *Knowledge of discharge instructions  Outcome: Progressing Towards Goal (2) soft/nontender

## 2024-06-06 NOTE — OB PROVIDER DELIVERY SUMMARY - NSSELHIDDEN_OBGYN_ALL_OB_FT
[NS_DeliveryAttending1_OBGYN_ALL_OB_FT:[id number]],[NS_DeliveryAssist1_OBGYN_ALL_OB_FT:[id number]],[NS_DeliveryAssist2_OBGYN_ALL_OB_FT:[id number]=],[NS_DeliveryRN_OBGYN_ALL_OB_FT:[id number]]

## 2024-06-06 NOTE — OB NEONATOLOGY/PEDIATRICIAN DELIVERY SUMMARY - NSPEDSNEONOTESA_OBGYN_ALL_OB_FT
Peds called to delivery for breech presentation. 37.2 wk SGA male born via scheduled primary CS to a  25 y/o  mother. Mother admitted for scheduled C/S, noted oligohydramnios, IUGR, and GDMA2 treated with insulin during current pregnancy. Maternal ob/gyn hx of fibroids. No other significant maternal history. Maternal labs include Blood Type O+ , HIV -. RPR, Rubella, Hep B levels pending. GBS- . ROM at delivery on  with clear fluids. Baby emerged vigorous, crying, was w/d/s/s with APGARS of 9/9. Mom plans to initiate breastfeeding, consents Hep B vaccine and consents circ. Admitted to  nursery.

## 2024-06-06 NOTE — OB RN PATIENT PROFILE - AS PAIN REST
Sent pt call with [employer] message to Dr. Rehman to review    0 (no pain/absence of nonverbal indicators of pain)

## 2024-06-06 NOTE — OB RN DELIVERY SUMMARY - NSSELHIDDEN_OBGYN_ALL_OB_FT
[NS_DeliveryAttending1_OBGYN_ALL_OB_FT:[id number]],[NS_DeliveryAssist1_OBGYN_ALL_OB_FT:[id number]],[NS_DeliveryAssist2_OBGYN_ALL_OB_FT:[id number]=],[NS_DeliveryRN_OBGYN_ALL_OB_FT:[id number]]

## 2024-06-06 NOTE — OB RN PATIENT PROFILE - CURRENT PREGNANCY COMPLICATIONS, OB PROFILE
breech/Gestational Diabetes/Oligohydramnios/Intrauterine Growth Restriction/Maternal Unknown GBS/Unstable Lie breech/Gestational Diabetes/Oligohydramnios/Intrauterine Growth Restriction/Unstable Lie

## 2024-06-06 NOTE — OB PROVIDER DELIVERY SUMMARY - NSPROVIDERDELIVERYNOTE_OBGYN_ALL_OB_FT
Scheduled pLTCS for breech presentation, uncomplicated.    Viable male infant, lisa breech presentation, Apgars 9/9, Wt 2280g (SGA), cord gasses sent.  Hysterotomy closed in single layer using Vicryl. Abdomen closed in standard fashion.  Grossly normal fallopian tubes, uterus, and ovaries.    Antibiotics: Ancef  Uterotonics: Extra pitocin, TXA    QBL: 131  IVF: 2100  UOP: 125    Dictation #[id number]

## 2024-06-06 NOTE — OB PROVIDER H&P - ASSESSMENT
A/P: Pt is a 28y G_P_ who presents for primary LTCS for breech presentation.    1. Admit to L&D. Routine Labs. IVF  2. Expectant Management vs. IOL  3. Fetus: Vertex, Reactive/CEFM  4. GBS pos, for Amp / GBS neg  5. Pain: IV pain meds/epidural PRN     Discussed with     Nathalia Engle MD, PGY-1  Obstetrics and Gynecology           A/P: Pt is a 28y  who presents for primary LTCS for breech presentation.    1. Admit to L&D. Pre-op labs. Routine Labs. IVF  2. Fetus: Breech, Reactive/CEFM       Discussed with     Janie Longoria, MS4  Nathalia Engle MD, PGY-1  Obstetrics and Gynecology           A/P: Pt is a 28y  who presents for primary LTCS for breech presentation.    1. Admit to L&D. Pre-op labs. Routine Labs. IVF  2. Fetus: Breech, Reactive/CEFM\  3. Contraception:        Discussed with Dr. Radha Longoria, MS4  Katt Colunga MD PGY4          A/P: Pt is a 28y  who presents for primary LTCS for breech presentation.    1. Admit to L&D. Pre-op labs. Routine Labs. IVF  2. Fetus: Breech, Reactive/CEFM\  3. Contraception: declines   4. Desires circ, consents signed   5. To be seen by anesthesia        Discussed with Dr. Radha Longoria, MS4  Katt Colunga MD PGY4

## 2024-06-06 NOTE — OB RN DELIVERY SUMMARY - NS_BREASTACTIONA_OBGYN_ALL_OB
Chief Complaint   Patient presents with    Follow-Up from Hospital     Interim history:  The patient was seen in the hospital when he was admitted because of increasing shortness of breath and syncope. He was found to have new onset PE. Was maintained on Eliquis. Patient was switched to Coumadin on discharge. He follows up with a Coumadin clinic. INR is therapeutic. No side effects or complications from treatment. Currently patient is complaining of increasing shortness of breath. He follows up with pulmonary Dr. Rubi Nathan. Currently on oxygen by 8 L/min nasal cannula. CT PE demonstrated bilateral segmental pulmonary embolisms -right lower lobar, lingula and left lower segmental branches  PE history -  11/2020 - Tiny nonocclusive pulmonary embolus in the right lower lobar pulmonary artery. 02/22 - CT PE negative  12/2022 - Partial incomplete filling defect right lower lobar  pulmonary artery. Partial filling defect lingular pulmonary artery. Filling  defects left lower lobe segmental branches. Hematology history  Patient seen by Dr. Baldomero Stern 12/2020 for xarelto failure for pulmonary embolism. At the time, his acute PE was suspected due to medication being held while in hospital. He was changed to eliqius from [facility] per patient's wishes. Patient started xarelto in 2018 due to heart condition by Dr. Kong Pacheco per patient. However was switched to eliqius in 2020 due to small Pulmonary embolism. 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 Colostomy was reversed. Vieyra syndrome negative. Patient stated he follows up with GI and had last colonoscopy last year and has been negative so far with no relapse of cancer. PMH - chronic idiopathic pulmonary fibrosis, atrial fibrillation, BPH, colon cancer.   Patient reportedly states that he was diagnosed with a pulmonary embolism this past February; however, per chart review and CT PE performed on 2/18/2022, no evidence of acute pulmonary embolism was found at that time. Past Medical History:   has a past medical history of Atrial fibrillation ([address] Back pain, chronic, Hill's esophagus, Benign essential HTN, BPH (benign prostatic hyperplasia), Cancer ([address] Chronic idiopathic pulmonary fibrosis ([facility] Cocaine abuse in remission ([facility] Community acquired bacterial pneumonia, ED (erectile dysfunction), GERD (gastroesophageal reflux disease), GI bleed, Hernia, History of colon cancer, Melena, Migraines, Multifocal pneumonia, Murmur, cardiac, and Single subsegmental pulmonary embolism without acute cor pulmonale ([address] Past Surgical History:   has a past surgical history that includes Tonsillectomy; Colonoscopy; colectomy; Colonoscopy (07/18/2016); knee surgery (Right, 1970's); transesophageal echocardiogram (11/29/2018); Upper gastrointestinal endoscopy (N/A, 12/5/2018); Colonoscopy (N/A, 12/6/2018); hernia repair (Right, 2009); Cardiac catheterization (11/29/2018); colectomy; Total knee arthroplasty (Right, 1/8/2019); Upper gastrointestinal endoscopy (N/A, 6/18/2019); Cardioversion (2020); and hip surgery (Right, 2/22/2022). Medications:    Prior to Admission medications    Medication Sig Start Date End Date Taking? Authorizing Provider   ferrous sulfate (IRON 325) 325 (65 Fe) MG tablet Take 1 tablet by mouth every 48 hours 12/26/22  Yes Noel Almendarez MD   predniSONE (DELTASONE) 10 MG tablet Take 1 tablet by mouth daily for 3 doses  Patient taking differently: Take 10 mg by mouth daily Taking 5 mg daily for 2 more doses  1/5/23 1/4/23 1/7/23 Yes Noel Stapleton MD   warfarin (COUMADIN) 2 MG tablet Take 1 tablet by mouth daily Review INR prior to administration.  Hazardous med- See facility policy for handling/disposal    Please follow-up with your Coumadin clinic for exact dosage of warfarin that needs to be taken every day based on your INR. 12/26/22  Yes Noel Rodriguez MD   midodrine (PROAMATINE) 5 MG tablet Take 1 tablet by mouth 3 times daily (before meals) 12/26/22  Yes Noel Almendarez MD   OXYGEN Inhale 4 L into the lungs continuous Uses 4-5 liters 24/7/ Pt. Use 8 L   Yes Daryl Palacios MD   omeprazole (PRILOSEC) 40 MG delayed release capsule take 1 capsule by mouth once daily 12/8/22  Yes Cassandra Oconnor MD   ibuprofen (ADVIL;MOTRIN) 800 MG tablet take 1 tablet by mouth three times a day if needed for MODERATE PAIN ( PAIN SCALE 4-6 ) 10/24/22  Yes Cassandra Oconnor MD   sertraline (ZOLOFT) 50 MG tablet Take 1 tablet by mouth daily 10/3/22  Yes Cassandra Oconnor MD   tamsulosin [facility]) 0.4 MG capsule take 1 capsule by mouth once daily 8/8/22  Yes Cassandra Oconnor MD   atorvastatin (LIPITOR) 40 MG tablet take 1 tablet by mouth once daily 4/28/22  Yes Cassandra Oconnor MD   digoxin Zachary Ap) 125 MCG tablet take 1 tablet by mouth once daily 4/28/22  Yes Carla Aldridge MD   [facility] Does not apply route Expires 1/2026 1/19/21  Yes Cassandra Oconnor MD     Current Outpatient Medications   Medication Sig Dispense Refill    ferrous sulfate (IRON 325) 325 (65 Fe) MG tablet Take 1 tablet by mouth every 48 hours 180 tablet 1    predniSONE (DELTASONE) 10 MG tablet Take 1 tablet by mouth daily for 3 doses (Patient taking differently: Take 10 mg by mouth daily Taking 5 mg daily for 2 more doses  1/5/23) 3 tablet 0    warfarin (COUMADIN) 2 MG tablet Take 1 tablet by mouth daily Review INR prior to administration. Hazardous med- See facility policy for handling/disposal    Please follow-up with your Coumadin clinic for exact dosage of warfarin that needs to be taken every day based on your INR. 30 tablet 3    midodrine (PROAMATINE) 5 MG tablet Take 1 tablet by mouth 3 times daily (before meals) 90 tablet 3    OXYGEN Inhale 4 L into the lungs continuous Uses 4-5 liters 24/7/ Pt.  Use 8 L omeprazole (PRILOSEC) 40 MG delayed release capsule take 1 capsule by mouth once daily 90 capsule 1    ibuprofen (ADVIL;MOTRIN) 800 MG tablet take 1 tablet by mouth three times a day if needed for MODERATE PAIN ( PAIN SCALE 4-6 ) 90 tablet 1    sertraline (ZOLOFT) 50 MG tablet Take 1 tablet by mouth daily 30 tablet 3    tamsulosin (FLOMAX) 0.4 MG capsule take 1 capsule by mouth once daily 90 capsule 1    atorvastatin (LIPITOR) 40 MG tablet take 1 tablet by mouth once daily 90 tablet 1    digoxin (LANOXIN) 125 MCG tablet take 1 tablet by mouth once daily 90 tablet 1    Handicap Placard MISC by Does not apply route Expires 1/2026 1 each 0     No current facility-administered medications for this visit. Allergies:  Adhesive tape, Codeine, Penicillins, and Nintedanib    Social History:   reports that he quit smoking about 52 years ago. His smoking use included cigarettes. He has a 0.50 pack-year smoking history. He has never used smokeless tobacco. He reports that he does not currently use alcohol after a past usage of about 12.0 standard drinks per week. He reports that he does not use drugs. Family History: family history includes Diabetes in his mother; Heart Attack in his father; Heart Disease in his brother and father. Review of Symptoms:    Constitutional: No fever or chills. No night sweats, no weight loss   Eyes: No eye discharge, double vision, or eye pain   HEENT: negative for sore mouth, sore throat, hoarseness and voice change   Respiratory: negative for cough , sputum, dyspnea, wheezing, hemoptysis, chest pain   Cardiovascular: negative for chest pain, dyspnea, palpitations, orthopnea, PND   Gastrointestinal: negative for nausea, vomiting, diarrhea, constipation, abdominal pain, Dysphagia, hematemesis and hematochezia   Genitourinary: negative for frequency, dysuria, nocturia, urinary incontinence, and hematuria   Integument: negative for rash, skin lesions, bruises.    Hematologic/Lymphatic: negative for easy bruising, bleeding, lymphadenopathy, or petechiae   Endocrine: negative for heat or cold intolerance,weight changes, change in bowel habits and hair loss   Musculoskeletal: negative for myalgias, arthralgias, pain, joint swelling,and bone pain   Neurological: negative for headaches, dizziness, seizures, weakness, numbness    PHYSICAL EXAM:      /62   Pulse 77   Temp 97.7 °F (36.5 °C) (Temporal)   Resp 18   Wt 164 lb 14.4 oz (74.8 kg)   BMI 22.36 kg/m²    Temp (24hrs), Av.4 °F (36.3 °C), Min:96.2 °F (35.7 °C), Max:98.1 °F (36.7 °C)      General appearance -patient has mild shortness of breath. He is on oxygen by nasal cannula 8 L/min. Mental status - alert and cooperative   Eyes - pupils equal and reactive, extraocular eye movements intact   Ears - bilateral TM's and external ear canals normal   Mouth - mucous membranes moist, pharynx normal without lesions   Neck - supple, no significant adenopathy   Lymphatics - no palpable lymphadenopathy, no hepatosplenomegaly   Chest -decreased air entry bilaterally. Scattered wheezing. Heart - normal rate, regular rhythm, normal S1, S2, no murmurs  Abdomen - soft, nontender, nondistended, no masses or organomegaly   Neurological - alert, oriented, normal speech, no focal findings or movement disorder noted   Musculoskeletal - no joint tenderness, deformity or swelling   Extremities - peripheral pulses normal, no pedal edema, no clubbing or cyanosis   Skin - normal coloration and turgor, no rashes, no suspicious skin lesions noted ,    Labs:   Complete Blood Count:   No results for input(s): WBC, HGB, MCV, PLT, RBC, HCT, MCH, MCHC, RDW, MPV in the last 72 hours.      PT/INR:    Lab Results   Component Value Date/Time    PROTIME 40.5 2023 02:51 PM    INR 3.4 2023 02:51 PM     PTT:    Lab Results   Component Value Date/Time    APTT 31.0 2022 06:23 AM       Basal Metabolic Profile:   No results for input(s): NA, K, BUN, CREATININE, CL, CO2 in the last 72 hours. Invalid input(s): ANION GAP     LFTs  No results for input(s): ALKPHOS, ALT, AST, BILITOT, BILIDIR, LABALBU in the last 72 hours. Imaging:  CT HEAD WO CONTRAST    Result Date: 12/19/2022  No acute intracranial abnormality. XR CHEST PORTABLE    Result Date: 12/19/2022  Pulmonary fibrotic changes are again identified. A superimposed pneumonia is not excluded. CT CHEST PULMONARY EMBOLISM W CONTRAST    Addendum Date: 12/19/2022    ADDENDUM: Discussed with Dr. Karen Posadas at 4:40 p.m. Result Date: 12/19/2022  Pulmonary emboli bilaterally as above. No evidence of right ventricular strain. Coronary artery disease and aortic valve disease. COPD and pulmonary fibrosis. RECOMMENDATIONS: The findings were sent to the Radiology Results [address] at 4:35 pm on 12/19/2022 to be communicated to a licensed caregiver. Impression:   Primary Problem  Acute pulmonary embolism (HCC)          Assessment and Plan:    Acute pulmonary embolism while on eliqius concerning for eliqius failure  History of adenocarcinoma of rectum 2014 - in remission per patient  Presyncope   Paroxysmal atrial fibrllation  UIP  NSTEMI type 2     Recommendations  Discussed with patient anticoagulation. Obviously he insist that he was taking Eliquis as prescribed. It was prescribed by cardiology 5 mg twice daily. Patient states he did not miss any doses. So if we take that into consideration patient has failed Eliquis. Discussed options of treatment. Warfarin versus Lovenox. Patient agreed on oral warfarin. Patient was referred to the Coumadin clinic. Handling treatment very well. No complications. INR is therapeutic. Patient's main problems now are related to underlying lung function and COPD and hypoxia. He is currently on oxygen 8 L/min. I have contacted his pulmonologist Dr. Hugo Dowling who agreed to see him soon.   Patient will be seen in our clinic as needed by Dr. Cathy Olmedo who saw this patient before for management of colon cancer in the past.  Patient's questions were answered to the best of his satisfaction and he verbalized full understanding and agreement. [facility]                                 This note is created with the assistance of a speech recognition program.  While intending to generate a document that actually reflects the content of the visit, the document can still have some errors including those of syntax and sound a like substitutions which may escape proof reading. It such instances, actual meaning can be extrapolated by contextual diversion. No action was needed

## 2024-06-06 NOTE — OB PROVIDER H&P - HISTORY OF PRESENT ILLNESS
HPI: Pt is a 28y  G_P_  presenting for X.  FM (+)  LOF (-)  CTX (-)  VB (-)  GBS pos/neg  EFW:    OBHx:  GynHx: Denies uterine polyps, fibroids, ovarian cysts, no abnml paps or STI/STDs  PMHx: Denies  PSHx: Denies  Med: PNV  All: NKDA  Psych: Denies hx of mental health issues  SH: Denies hx of smoking, drinking, or drug usage during the pregnancy     HPI: Pt is a 28y   presenting for schedule  for breech presentation with pregnancy c/b GDMA2 on Humulin and IUGR. Fasting glucose: 100-102 and after meal glucose <120.   FM (+)  LOF (-)  CTX (-)  VB (-)  GBS   EFW: 5#2 by sono last week.   Denies headache, blurry vision, N/V, chest pain, abdominal pain and SOB.     OBHx: P0  GynHx: +ovarian cysts. Denies uterine polyps, fibroids, no abnml paps or STI/STDs  PMHx: Denies  PSHx: Denies  Med: PNVs, Humulin  All: NKDA, eggplant  Psych: Denies hx of mental health issues  SH: Lives at home with  and in-laws. Denies hx of smoking, drinking, or drug usage during the pregnancy     HPI: Pt is a 28y   presenting for schedule  for breech presentation with pregnancy c/b GDMA2 on Humulin and IUGR.   FM (+)  LOF (-)  CTX (-)  VB (-)  GBS   EFW: 5#2 by sono last week.   Denies headache, blurry vision, N/V, chest pain, abdominal pain and SOB.     OBHx: P0  GynHx: +ovarian cysts. Denies uterine polyps, fibroids, no abnml paps or STI/STDs  PMHx: Denies  PSHx: Denies  Med: PNVs, Humulin  All: NKDA, eggplant  Psych: Denies hx of mental health issues  SH: Lives at home with  and in-laws. Denies hx of smoking, drinking, or drug usage during the pregnancy     HPI: Pt is a 28y   presenting for schedule  for breech presentation with pregnancy c/b GDMA2 on Humulin and IUGR.   FM (+)  LOF (-)  CTX (-)  VB (-)    Denies headache, blurry vision, N/V, chest pain, abdominal pain and SOB.     ATU(): Breech, post. SLY 7.25. 2313g (6%, AC <1%, UADnl)  Wt 2500g extrapolated     OBHx: P0  GynHx: +ovarian cysts. Denies uterine polyps, fibroids, no abnml paps or STI/STDs  PMHx: Denies  PSHx: Denies  Med: PNVs, Humulin  All: NKDA, eggplant  Psych: Denies hx of mental health issues  SH: Lives at home with  and in-laws. Denies hx of smoking, drinking, or drug usage during the pregnancy

## 2024-06-06 NOTE — OB RN PATIENT PROFILE - FALL HARM RISK - UNIVERSAL INTERVENTIONS
Bed in lowest position, wheels locked, appropriate side rails in place/Call bell, personal items and telephone in reach/Instruct patient to call for assistance before getting out of bed or chair/Non-slip footwear when patient is out of bed/[place] to call system/Physically safe environment - no spills, clutter or unnecessary equipment/Purposeful Proactive Rounding/Room/bathroom lighting operational, light cord in reach

## 2024-06-06 NOTE — OB RN PREOPERATIVE CHECKLIST - BLOOD AVAILABLE
Ambulatory Care [facility] Owasso, [place] Sebastian Thomas MD [facility] BS AMB
n/a

## 2024-06-06 NOTE — OB RN PATIENT PROFILE - FALL HARM RISK - PATIENT NEEDS ASSISTANCE
[FreeTextEntry1] : A: Nice improvement following repair forehead laceration and abrasions P: Sutures removed and scar care reviewed Continue bacitracin to abrasions.  
No assistance needed

## 2024-06-06 NOTE — OB RN PATIENT PROFILE - NSICDXPASTMEDICALHX_GEN_ALL_CORE_FT
PAST MEDICAL HISTORY:  Gestational diabetes     Hemorrhoids     Maternal care for breech presentation, not applicable or unspecified     Ovarian cyst

## 2024-06-06 NOTE — OB RN INTRAOPERATIVE NOTE - NSSELHIDDEN_OBGYN_ALL_OB_FT
[NS_DeliveryAttending1_OBGYN_ALL_OB_FT:[id number]],[NS_DeliveryAssist1_OBGYN_ALL_OB_FT:[id number]],[NS_DeliveryAssist2_OBGYN_ALL_OB_FT:[id number]],[NS_DeliveryRN_OBGYN_ALL_OB_FT:[id number]]

## 2024-06-06 NOTE — LACTATION INITIAL EVALUATION - INTERVENTION OUTCOME
Primary Nurse made aware of current status./verbalizes understanding/demonstrates understanding of teaching/good return demonstration

## 2024-06-06 NOTE — OB RN DELIVERY SUMMARY - NSCSDELIVATYPE_OBGYN_ALL_OB
Head,  normocephalic,  atraumatic,  Face,  Face within normal limits,  Ears,  External ears within normal limits,  Nose/Nasopharynx,  External nose  normal appearance,  nares patent,  no nasal discharge,  Mouth and Throat,  Oral cavity appearance normal,  Breath odor normal,  Lips,  Appearance normal Primary

## 2024-06-06 NOTE — LACTATION INITIAL EVALUATION - LACTATION INTERVENTIONS
Mom educated about babies less than 24 hours of age will be sleepy. Made aware of cluster feeding that occurs after 24 hours of life and to be cautious of sleep deprivation in order to maintain infant and mother safety. Instructed to place infant in bassinet or call for assistance if feeling sleepy or tired.Recognition of feeding cues and to feed the baby on demand based on cues at least 8-12 times in a day. Instructed pt. to wake the baby to feed if no feeding cues are seen within 3h since prior feed. Pt. educated on the nutritional needs of the baby, how many wet and dirty diapers to expect, along with the amount of times the baby needs to be placed on the breast at this time.  use  feeding log to record feedings along with wet and dirty diapers. instructed in hand expression with good return demonstration.  Reviewed safe skin to skin. Verbalized understanding of education. Assisted mom with latch and positioning. Encouraged deeper latch. Infant very sleepy & mucousy Did couple of sucks. Did safe skin to skin. Encouraged to breastfeed the baby on demand based on cues and at least 8-12 times in a day. Instructed to log feedings along with wet and dirty diapers. Went over New beginning book. Mother and Infant roomed-in.   Mother educated on safe skin to skin care, safe sleep practices and environment. Call bell within reach./initiate/review safe skin-to-skin/initiate/review hand expression/initiate/review techniques for position and latch/initiate/review supplementation plan due to medical indications/review techniques to increase milk supply/review techniques to manage sore nipples/engorgement/initiate/review finger suck/initiate/review breast massage/compression/initiate/review alternate feeding method/reviewed components of an effective feeding and at least 8 effective feedings per day required/reviewed importance of monitoring infant diapers, the breastfeeding log, and minimum output each day/reviewed risks of unnecessary formula supplementation/reviewed risks of artificial nipples/reviewed strategies to transition to breastfeeding only/reviewed benefits and recommendations for rooming in/reviewed feeding on demand/by cue at least 8 times a day/recommended follow-up with pediatrician within 24 hours of discharge/reviewed indications of inadequate milk transfer that would require supplementation

## 2024-06-07 ENCOUNTER — APPOINTMENT (OUTPATIENT)
Dept: ANTEPARTUM | Facility: CLINIC | Age: 29
End: 2024-06-07

## 2024-06-07 ENCOUNTER — TRANSCRIPTION ENCOUNTER (OUTPATIENT)
Age: 29
End: 2024-06-07

## 2024-06-07 LAB
BASOPHILS # BLD AUTO: 0.02 K/UL — SIGNIFICANT CHANGE UP (ref 0–0.2)
BASOPHILS NFR BLD AUTO: 0.2 % — SIGNIFICANT CHANGE UP (ref 0–2)
EOSINOPHIL # BLD AUTO: 0.01 K/UL — SIGNIFICANT CHANGE UP (ref 0–0.5)
EOSINOPHIL NFR BLD AUTO: 0.1 % — SIGNIFICANT CHANGE UP (ref 0–6)
HCT VFR BLD CALC: 28.9 % — LOW (ref 34.5–45)
HCV AB S/CO SERPL IA: 0.14 S/CO — SIGNIFICANT CHANGE UP (ref 0–0.99)
HCV AB SERPL-IMP: SIGNIFICANT CHANGE UP
HGB BLD-MCNC: 9.2 G/DL — LOW (ref 11.5–15.5)
IANC: 6.7 K/UL — SIGNIFICANT CHANGE UP (ref 1.8–7.4)
IMM GRANULOCYTES NFR BLD AUTO: 0.7 % — SIGNIFICANT CHANGE UP (ref 0–0.9)
LYMPHOCYTES # BLD AUTO: 1.97 K/UL — SIGNIFICANT CHANGE UP (ref 1–3.3)
LYMPHOCYTES # BLD AUTO: 20.8 % — SIGNIFICANT CHANGE UP (ref 13–44)
MCHC RBC-ENTMCNC: 26.6 PG — LOW (ref 27–34)
MCHC RBC-ENTMCNC: 31.8 GM/DL — LOW (ref 32–36)
MCV RBC AUTO: 83.5 FL — SIGNIFICANT CHANGE UP (ref 80–100)
MONOCYTES # BLD AUTO: 0.68 K/UL — SIGNIFICANT CHANGE UP (ref 0–0.9)
MONOCYTES NFR BLD AUTO: 7.2 % — SIGNIFICANT CHANGE UP (ref 2–14)
NEUTROPHILS # BLD AUTO: 6.7 K/UL — SIGNIFICANT CHANGE UP (ref 1.8–7.4)
NEUTROPHILS NFR BLD AUTO: 71 % — SIGNIFICANT CHANGE UP (ref 43–77)
NRBC # BLD: 0 /100 WBCS — SIGNIFICANT CHANGE UP (ref 0–0)
NRBC # FLD: 0 K/UL — SIGNIFICANT CHANGE UP (ref 0–0)
PLATELET # BLD AUTO: 132 K/UL — LOW (ref 150–400)
RBC # BLD: 3.46 M/UL — LOW (ref 3.8–5.2)
RBC # FLD: 15 % — HIGH (ref 10.3–14.5)
WBC # BLD: 9.45 K/UL — SIGNIFICANT CHANGE UP (ref 3.8–10.5)
WBC # FLD AUTO: 9.45 K/UL — SIGNIFICANT CHANGE UP (ref 3.8–10.5)

## 2024-06-07 PROCEDURE — 74177 CT ABD & PELVIS W/CONTRAST: CPT | Mod: 26

## 2024-06-07 RX ORDER — ACETAMINOPHEN 500 MG
3 TABLET ORAL
Qty: 0 | Refills: 0 | DISCHARGE
Start: 2024-06-07

## 2024-06-07 RX ORDER — OXYCODONE HYDROCHLORIDE 5 MG/1
5 TABLET ORAL
Refills: 0 | Status: DISCONTINUED | OUTPATIENT
Start: 2024-06-07 | End: 2024-06-10

## 2024-06-07 RX ORDER — OXYCODONE HYDROCHLORIDE 5 MG/1
1 TABLET ORAL
Qty: 5 | Refills: 0
Start: 2024-06-07

## 2024-06-07 RX ORDER — IBUPROFEN 200 MG
600 TABLET ORAL EVERY 6 HOURS
Refills: 0 | Status: DISCONTINUED | OUTPATIENT
Start: 2024-06-07 | End: 2024-06-10

## 2024-06-07 RX ORDER — IBUPROFEN 200 MG
1 TABLET ORAL
Qty: 0 | Refills: 0 | DISCHARGE
Start: 2024-06-07

## 2024-06-07 RX ADMIN — Medication 975 MILLIGRAM(S): at 03:08

## 2024-06-07 RX ADMIN — Medication 600 MILLIGRAM(S): at 18:30

## 2024-06-07 RX ADMIN — Medication 975 MILLIGRAM(S): at 21:39

## 2024-06-07 RX ADMIN — OXYCODONE HYDROCHLORIDE 5 MILLIGRAM(S): 5 TABLET ORAL at 04:30

## 2024-06-07 RX ADMIN — MAGNESIUM HYDROXIDE 30 MILLILITER(S): 400 TABLET, CHEWABLE ORAL at 08:53

## 2024-06-07 RX ADMIN — HEPARIN SODIUM 5000 UNIT(S): 5000 INJECTION INTRAVENOUS; SUBCUTANEOUS at 21:10

## 2024-06-07 RX ADMIN — Medication 975 MILLIGRAM(S): at 09:30

## 2024-06-07 RX ADMIN — Medication 975 MILLIGRAM(S): at 03:45

## 2024-06-07 RX ADMIN — Medication 30 MILLIGRAM(S): at 12:40

## 2024-06-07 RX ADMIN — Medication 975 MILLIGRAM(S): at 08:52

## 2024-06-07 RX ADMIN — Medication 30 MILLIGRAM(S): at 13:15

## 2024-06-07 RX ADMIN — Medication 30 MILLIGRAM(S): at 06:00

## 2024-06-07 RX ADMIN — OXYCODONE HYDROCHLORIDE 5 MILLIGRAM(S): 5 TABLET ORAL at 17:30

## 2024-06-07 RX ADMIN — Medication 975 MILLIGRAM(S): at 15:45

## 2024-06-07 RX ADMIN — SIMETHICONE 80 MILLIGRAM(S): 80 TABLET, CHEWABLE ORAL at 04:13

## 2024-06-07 RX ADMIN — Medication 30 MILLIGRAM(S): at 06:30

## 2024-06-07 RX ADMIN — OXYCODONE HYDROCHLORIDE 5 MILLIGRAM(S): 5 TABLET ORAL at 04:13

## 2024-06-07 RX ADMIN — Medication 975 MILLIGRAM(S): at 15:07

## 2024-06-07 RX ADMIN — OXYCODONE HYDROCHLORIDE 5 MILLIGRAM(S): 5 TABLET ORAL at 16:55

## 2024-06-07 RX ADMIN — Medication 30 MILLIGRAM(S): at 01:00

## 2024-06-07 RX ADMIN — Medication 600 MILLIGRAM(S): at 17:53

## 2024-06-07 RX ADMIN — Medication 975 MILLIGRAM(S): at 22:15

## 2024-06-07 RX ADMIN — HEPARIN SODIUM 5000 UNIT(S): 5000 INJECTION INTRAVENOUS; SUBCUTANEOUS at 08:53

## 2024-06-07 RX ADMIN — Medication 30 MILLIGRAM(S): at 01:27

## 2024-06-07 RX ADMIN — SIMETHICONE 80 MILLIGRAM(S): 80 TABLET, CHEWABLE ORAL at 16:56

## 2024-06-07 NOTE — DISCHARGE NOTE OB - MOOD SWINGS / DEPRESSION OR CRYING SPELLS LASTING MORE THAN 3 DAYS
Statement Selected Quality 130: Documentation Of Current Medications In The Medical Record: Current Medications Documented Quality 431: Preventive Care And Screening: Unhealthy Alcohol Use - Screening: Patient not identified as an unhealthy alcohol user when screened for unhealthy alcohol use using a systematic screening method Detail Level: Detailed Quality 110: Preventive Care And Screening: Influenza Immunization: Influenza immunization was not ordered or administered, reason not given Quality 226: Preventive Care And Screening: Tobacco Use: Screening And Cessation Intervention: Patient screened for tobacco use, is a smoker AND received Cessation Counseling within measurement period or in the six months prior to the measurement period

## 2024-06-07 NOTE — PROGRESS NOTE ADULT - SUBJECTIVE AND OBJECTIVE BOX
POST OP DAY  1  s/p   SECTION    SUBJECTIVE:  I'm ok.    PAIN SCALE SCORE: [x] Refer to charted pain scores    THERAPY:  [ s ] Spinal morphine   [  ] Epidural morphine   [  ] IV PCA Hydromorphone 1 mg/ml    OBJECTIVE:  Comfortable Appearing lying in bed    SEDATION SCORE:	  [ x ] Alert	    [  ] Drowsy        [  ] Arousable	[  ] Asleep	[  ] Unresponsive    Side Effects:	  [ x ] None	     [  ] Nausea        [  ] Pruritus        [  ] Weakness   [  ] Numbness        ASSESSMENT/ PLAN   [   ] Discontinue         [  ] Continue    [ x ] Change to prn Analgesics as per primary service.    DOCUMENTATION & VERIFICATION OF CURRENT MEDS [ x ] Done    COMMENTS: No Headache.      NO anesthesia complications

## 2024-06-07 NOTE — DISCHARGE NOTE OB - PLAN OF CARE
- Take Motrin 600mg every 6 hours and/or tylenol 975mg every 6 hours as needed for pain.    - Call your OBGYN to schedule a follow up appointment in 1-2weeks.   - Keep incision site clean and dry.   - Call your OBGYN if you experience severe abdominal pain not improved by oral pain medications, heavy bright red vaginal bleeding saturating more than 1 pad per hour, redness/warmth at incision site, drainage from incision site, or fever greater than 100.4F.

## 2024-06-07 NOTE — DISCHARGE NOTE OB - PATIENT PORTAL LINK FT
You can access the FollowMyHealth Patient Portal offered by [facility] by registering at the following website: [email or web address]. By joining [facility]’s FollowMyHealth portal, you will also be able to view your health information using other applications (apps) compatible with our system.

## 2024-06-07 NOTE — DISCHARGE NOTE OB - CARE PROVIDER_API CALL
Marva Gil  Obstetrics and Gynecology  [address], [address]  [place] [address]  Phone: [phone]  Fax: [phone]  Follow Up Time: 2 weeks

## 2024-06-07 NOTE — PROGRESS NOTE ADULT - ASSESSMENT
A/P: 27yo POD#1 s/p pLTCS for breech presentation.  Patient is stable and doing well post-operatively.    - Continue regular diet.  - Increase ambulation.  - Continue motrin, tylenol, oxycodone PRN for pain control.    - F/u AM CBC    Ananya Winters PGY-1

## 2024-06-07 NOTE — PROGRESS NOTE ADULT - SUBJECTIVE AND OBJECTIVE BOX
OB Progress Note:  Delivery, POD#1    S: 29yo POD#1 s/p pLTCS for . Her pain is well controlled. She is tolerating a regular diet and passing flatus. Denies N/V. Denies CP/SOB/lightheadedness/dizziness.   She is ambulating without difficulty.   Voiding spontaneously.     O:   Vital Signs Last 24 Hrs  T(C): 36.8 (2024 05:48), Max: 36.9 (2024 22:00)  T(F): 98.2 (2024 05:48), Max: 98.4 (2024 22:00)  HR: 67 (2024 05:48) (67 - 89)  BP: 98/50 (2024 05:48) (94/80 - 127/76)  BP(mean): 78 (2024 18:00) (61 - 98)  RR: 18 (2024 05:48) (12 - 19)  SpO2: 100% (2024 05:48) (98% - 100%)    Parameters below as of 2024 01:00  Patient On (Oxygen Delivery Method): room air        Labs:  Blood type: O Positive  Rubella IgG: RPR: Negative                          9.2<L>   9.45 >-----------< 132<L>    (  @ 05:15 )             28.9<L>                        12.0   8.64 >-----------< 181    (  @ 10:40 )             37.7                        11.6   7.88 >-----------< 179    (  @ 12:30 )             35.8    24 @ 12:30      137  |  104  |  12  ----------------------------<  74  4.1   |  17<L>  |  0.54        Ca    9.1      2024 12:30            PE:  General: NAD  Abdomen: Mildly distended, appropriately tender, incision c/d/i.  Extremities: No erythema, no pitting edema     OB Progress Note:  Delivery, POD#1    S: 27yo POD#1 s/p pLTCS for breech presentation. Her pain is well controlled. She is tolerating a regular diet and passing flatus. Denies N/V. Denies CP/SOB/lightheadedness/dizziness.   She is ambulating without difficulty.   Voiding spontaneously.     O:   Vital Signs Last 24 Hrs  T(C): 36.8 (2024 05:48), Max: 36.9 (2024 22:00)  T(F): 98.2 (2024 05:48), Max: 98.4 (2024 22:00)  HR: 67 (2024 05:48) (67 - 89)  BP: 98/50 (2024 05:48) (94/80 - 127/76)  BP(mean): 78 (2024 18:00) (61 - 98)  RR: 18 (2024 05:48) (12 - 19)  SpO2: 100% (2024 05:48) (98% - 100%)    Parameters below as of 2024 01:00  Patient On (Oxygen Delivery Method): room air        Labs:  Blood type: O Positive  Rubella IgG: RPR: Negative                 12.0   8.64 >-----------< 181    (  @ 10:40 )             37.7                        11.6   7.88 >-----------< 179    (  @ 12:30 )             35.8    - @ 12:30      137  |  104  |  12  ----------------------------<  74  4.1   |  17<L>  |  0.54        Ca    9.1      2024 12:30            PE:  General: NAD  Abdomen: Mildly distended, appropriately tender, incision c/d/i.  Extremities: No erythema, no pitting edema

## 2024-06-07 NOTE — DISCHARGE NOTE OB - CARE PLAN
1 Principal Discharge DX:	Status post primary low transverse  section  Assessment and plan of treatment:	- Take Motrin 600mg every 6 hours and/or tylenol 975mg every 6 hours as needed for pain.    - Call your OBGYN to schedule a follow up appointment in 1-2weeks.   - Keep incision site clean and dry.   - Call your OBGYN if you experience severe abdominal pain not improved by oral pain medications, heavy bright red vaginal bleeding saturating more than 1 pad per hour, redness/warmth at incision site, drainage from incision site, or fever greater than 100.4F.

## 2024-06-07 NOTE — DISCHARGE NOTE OB - MEDICATION SUMMARY - MEDICATIONS TO STOP TAKING
I will STOP taking the medications listed below when I get home from the hospital:    HumuLIN N 100 units/mL subcutaneous suspension  -- 24 international unit(s) subcutaneous once a day (at bedtime)

## 2024-06-07 NOTE — DISCHARGE NOTE OB - HOSPITAL COURSE
Patient underwent uncomplicated p]LTCS for breech presentation. , Hct: 35.8->37.7->28.9, pts post-operative course was significant for acute blood loss anemia and CT A/P was done to r/o intraperitoneal collection. CT returned negative. The patient's vital signs remained stable and she remained afebrile throughout. Upon discharge on POD3, the patient is ambulating and voiding spontaneously, tolerating oral intake. Pain was well controlled with oral medication and vital signs remain stable.  Patient underwent uncomplicated pLTCS for breech presentation. , Hct: 35.8->37.7->28.9, pts post-operative course was significant for acute blood loss anemia and CT A/P was done to r/o intraperitoneal collection. CT returned negative. The patient's vital signs remained stable and she remained afebrile throughout. Upon discharge on POD4, the patient is ambulating and voiding spontaneously, tolerating oral intake. Pain was well controlled with oral medication and vital signs remain stable.

## 2024-06-07 NOTE — CHART NOTE - NSCHARTNOTEFT_GEN_A_CORE
R1 Postpartum Eval    Patient seen and evaluated at bedside for inappropriate drop in Hct not accounted for by QBL of 131. Patient denies lightheadedness or dizziness. VSS. Patient is ambulating OOB. Patient ordered for CT A/P w/ IV contrast to evaluate for intra-abdominal collection. Patient declining blood at this time    D/w Dr. Scott Winters PGY1

## 2024-06-08 RX ADMIN — OXYCODONE HYDROCHLORIDE 5 MILLIGRAM(S): 5 TABLET ORAL at 01:56

## 2024-06-08 RX ADMIN — Medication 600 MILLIGRAM(S): at 00:05

## 2024-06-08 RX ADMIN — Medication 600 MILLIGRAM(S): at 11:34

## 2024-06-08 RX ADMIN — Medication 600 MILLIGRAM(S): at 18:05

## 2024-06-08 RX ADMIN — Medication 975 MILLIGRAM(S): at 07:43

## 2024-06-08 RX ADMIN — HEPARIN SODIUM 5000 UNIT(S): 5000 INJECTION INTRAVENOUS; SUBCUTANEOUS at 08:57

## 2024-06-08 RX ADMIN — HEPARIN SODIUM 5000 UNIT(S): 5000 INJECTION INTRAVENOUS; SUBCUTANEOUS at 21:32

## 2024-06-08 RX ADMIN — Medication 975 MILLIGRAM(S): at 21:32

## 2024-06-08 RX ADMIN — Medication 975 MILLIGRAM(S): at 08:43

## 2024-06-08 RX ADMIN — Medication 600 MILLIGRAM(S): at 00:45

## 2024-06-08 RX ADMIN — Medication 975 MILLIGRAM(S): at 22:26

## 2024-06-08 RX ADMIN — Medication 600 MILLIGRAM(S): at 17:28

## 2024-06-08 RX ADMIN — Medication 600 MILLIGRAM(S): at 12:08

## 2024-06-08 RX ADMIN — Medication 1 APPLICATION(S): at 00:38

## 2024-06-08 RX ADMIN — Medication 975 MILLIGRAM(S): at 15:22

## 2024-06-08 RX ADMIN — OXYCODONE HYDROCHLORIDE 5 MILLIGRAM(S): 5 TABLET ORAL at 02:15

## 2024-06-08 RX ADMIN — Medication 975 MILLIGRAM(S): at 14:33

## 2024-06-08 NOTE — PROGRESS NOTE ADULT - ASSESSMENT
A/P: 27yo POD#2 s/p pLTCS for breech presentation.  Patient is stable and doing well post-operatively.      #postpartum  - Continue regular diet.  - Increase ambulation.  - Continue motrin, tylenol, oxycodone PRN for pain control.    - H/H 11.6/35.8->12.0/37.7->9.2/28.9  - Pt with bigger drop in hct than expected, CT A/P (): Postpartum uterus status post . No significant pelvic or abdominal wall hematoma.  - Discharge planning     Ching Parsons PGY-1

## 2024-06-08 NOTE — PROGRESS NOTE ADULT - SUBJECTIVE AND OBJECTIVE BOX
R1 Progress Note    27yo POD#2 s/p pLTCS for breech presentation. Patient seen and examined at bedside, no acute overnight events. No acute complaints, pain well controlled. Patient is ambulating, voiding, and tolerating regular diet. Passing flatus. Denies CP, SOB, N/V, HA, blurred vision, epigastric pain.    Vital Signs Last 24 Hours  T(C): 36.5 (06-08-24 @ 05:40), Max: 37.2 (06-07-24 @ 14:02)  HR: 72 (06-08-24 @ 05:40) (72 - 83)  BP: 111/62 (06-08-24 @ 05:40) (106/70 - 129/70)  RR: 17 (06-08-24 @ 05:40) (17 - 19)  SpO2: 100% (06-08-24 @ 05:40) (99% - 100%)    I&O's Summary    07 Jun 2024 07:01  -  08 Jun 2024 07:00  --------------------------------------------------------  IN: 0 mL / OUT: 800 mL / NET: -800 mL        Physical Exam:  General: NAD  Abdomen: Soft, non-tender, non-distended, fundus firm  Incision: Pfannenstiel incision CDI, subcuticular suture closure  Pelvic: Lochia wnl    Labs:    Blood Type: O Positive  Antibody Screen: Negative  RPR: Negative               9.2    9.45  )-----------( 132      ( 06-07 @ 05:15 )             28.9                12.0   8.64  )-----------( 181      ( 06-06 @ 10:40 )             37.7                11.6   7.88  )-----------( 179      ( 06-05 @ 12:30 )             35.8         MEDICATIONS  (STANDING):  acetaminophen     Tablet .. 975 milliGRAM(s) Oral <User Schedule>  chlorhexidine 2% Cloths 1 Application(s) Topical daily  diphtheria/tetanus/pertussis (acellular) Vaccine (Adacel) 0.5 milliLiter(s) IntraMuscular once  heparin   Injectable 5000 Unit(s) SubCutaneous every 12 hours  ibuprofen  Tablet. 600 milliGRAM(s) Oral every 6 hours  lactated ringers. 1000 milliLiter(s) (125 mL/Hr) IV Continuous <Continuous>  lactated ringers. 1000 milliLiter(s) (200 mL/Hr) IV Continuous <Continuous>  lactated ringers. 1000 milliLiter(s) (125 mL/Hr) IV Continuous <Continuous>  lactated ringers. 1000 milliLiter(s) (75 mL/Hr) IV Continuous <Continuous>  morphine PF Spinal 0.1 milliGRAM(s) IntraThecal. once  oxytocin Infusion 333.333 milliUNIT(s)/Min (1000 mL/Hr) IV Continuous <Continuous>    MEDICATIONS  (PRN):  dexAMETHasone  Injectable 4 milliGRAM(s) IV Push every 6 hours PRN Nausea  diphenhydrAMINE 25 milliGRAM(s) Oral every 6 hours PRN Pruritus  lanolin Ointment 1 Application(s) Topical every 6 hours PRN Sore Nipples  magnesium hydroxide Suspension 30 milliLiter(s) Oral two times a day PRN Constipation  nalbuphine Injectable 2.5 milliGRAM(s) IV Push every 6 hours PRN Pruritus  naloxone Injectable 0.1 milliGRAM(s) IV Push every 3 minutes PRN For ANY of the following changes in patient status:  A. Breaths Per Minute LESS THAN 10, B. Oxygen saturation LESS THAN 90%, C. Sedation score of 6 for Stop After: 4 Times  ondansetron Injectable 4 milliGRAM(s) IV Push every 6 hours PRN Nausea  oxyCODONE    IR 5 milliGRAM(s) Oral every 3 hours PRN Moderate to Severe Pain (4-10)  oxyCODONE    IR 5 milliGRAM(s) Oral once PRN Moderate to Severe Pain (4-10)  simethicone 80 milliGRAM(s) Chew every 4 hours PRN Gas

## 2024-06-09 LAB
HCT VFR BLD CALC: 31.1 % — LOW (ref 34.5–45)
HGB BLD-MCNC: 9.9 G/DL — LOW (ref 11.5–15.5)
MCHC RBC-ENTMCNC: 26.8 PG — LOW (ref 27–34)
MCHC RBC-ENTMCNC: 31.8 GM/DL — LOW (ref 32–36)
MCV RBC AUTO: 84.1 FL — SIGNIFICANT CHANGE UP (ref 80–100)
NRBC # BLD: 0 /100 WBCS — SIGNIFICANT CHANGE UP (ref 0–0)
NRBC # FLD: 0 K/UL — SIGNIFICANT CHANGE UP (ref 0–0)
PLATELET # BLD AUTO: 147 K/UL — LOW (ref 150–400)
RBC # BLD: 3.7 M/UL — LOW (ref 3.8–5.2)
RBC # FLD: 15 % — HIGH (ref 10.3–14.5)
WBC # BLD: 5.93 K/UL — SIGNIFICANT CHANGE UP (ref 3.8–10.5)
WBC # FLD AUTO: 5.93 K/UL — SIGNIFICANT CHANGE UP (ref 3.8–10.5)

## 2024-06-09 RX ADMIN — HEPARIN SODIUM 5000 UNIT(S): 5000 INJECTION INTRAVENOUS; SUBCUTANEOUS at 10:32

## 2024-06-09 RX ADMIN — Medication 600 MILLIGRAM(S): at 00:48

## 2024-06-09 RX ADMIN — Medication 600 MILLIGRAM(S): at 18:41

## 2024-06-09 RX ADMIN — Medication 600 MILLIGRAM(S): at 06:40

## 2024-06-09 RX ADMIN — Medication 975 MILLIGRAM(S): at 11:02

## 2024-06-09 RX ADMIN — HEPARIN SODIUM 5000 UNIT(S): 5000 INJECTION INTRAVENOUS; SUBCUTANEOUS at 21:40

## 2024-06-09 RX ADMIN — Medication 975 MILLIGRAM(S): at 16:30

## 2024-06-09 RX ADMIN — Medication 1 APPLICATION(S): at 21:39

## 2024-06-09 RX ADMIN — Medication 600 MILLIGRAM(S): at 06:59

## 2024-06-09 RX ADMIN — Medication 975 MILLIGRAM(S): at 15:42

## 2024-06-09 RX ADMIN — Medication 600 MILLIGRAM(S): at 00:16

## 2024-06-09 RX ADMIN — Medication 975 MILLIGRAM(S): at 10:32

## 2024-06-09 RX ADMIN — Medication 975 MILLIGRAM(S): at 22:26

## 2024-06-09 RX ADMIN — Medication 600 MILLIGRAM(S): at 23:41

## 2024-06-09 RX ADMIN — MAGNESIUM HYDROXIDE 30 MILLILITER(S): 400 TABLET, CHEWABLE ORAL at 13:11

## 2024-06-09 RX ADMIN — Medication 600 MILLIGRAM(S): at 13:41

## 2024-06-09 RX ADMIN — Medication 600 MILLIGRAM(S): at 13:11

## 2024-06-09 RX ADMIN — Medication 975 MILLIGRAM(S): at 21:40

## 2024-06-09 RX ADMIN — Medication 600 MILLIGRAM(S): at 19:24

## 2024-06-09 NOTE — PROGRESS NOTE ADULT - ASSESSMENT
A/P: 29yo POD#3 s/p pLTCS for breech presentation.  Patient is stable and doing well post-operatively.      #postpartum  - Continue regular diet.  - Increase ambulation.  - Continue motrin, tylenol, oxycodone PRN for pain control.    - H/H 11.6/35.8->12.0/37.7->9.2/28.9, f/u AM CBC   - Pt with bigger drop in hct than expected, CT A/P (): Postpartum uterus status post . No significant pelvic or abdominal wall hematoma.  - Discharge planning     Ching Parsons PGY-1  A/P: 29yo POD#3 s/p pLTCS for breech presentation.  Patient is stable and doing well post-operatively.      #postpartum  - Continue regular diet.  - Increase ambulation.  - Continue motrin, tylenol, oxycodone PRN for pain control.    - H/H 11.6/35.8->12.0/37.7->9.2/28.9, f/u AM CBC   - Pt with bigger drop in hct than expected, CT A/P (): Postpartum uterus status post . No significant pelvic or abdominal wall hematoma.  - Discharge planning     Ching Parsons PGY-1     29y/o POD#3 s/p pLTCS for breech presentation. CT abd/pelvis done for unexpected drop in H&H. Patient feeling well and asymptomatic, CBC stable. Pt. OK for d/c home when infant cleared for discharge (infant held for wt loss).  Crystal Beauchamp M.D.

## 2024-06-09 NOTE — PROGRESS NOTE ADULT - SUBJECTIVE AND OBJECTIVE BOX
R1 Progress Note    27yo POD#3 s/p pLTCS for breech presentation. Patient seen and examined at bedside, no acute overnight events. No acute complaints, pain well controlled. Patient is ambulating, voiding, and tolerating regular diet. Passing flatus. Denies CP, SOB, N/V, HA, blurred vision, epigastric pain.    Vital Signs Last 24 Hours  T(C): 36.4 (06-09-24 @ 06:23), Max: 36.9 (06-08-24 @ 14:00)  HR: 72 (06-09-24 @ 06:23) (72 - 83)  BP: 101/62 (06-09-24 @ 06:23) (101/62 - 126/73)  RR: 18 (06-08-24 @ 21:07) (17 - 18)  SpO2: 100% (06-08-24 @ 21:07) (100% - 100%)    I&O's Summary      Physical Exam:  General: NAD  Abdomen: Soft, non-tender, non-distended, fundus firm  Incision: Pfannenstiel incision CDI, subcuticular suture closure  Pelvic: Lochia wnl    Labs:    Blood Type: O Positive  Antibody Screen: Negative  RPR: Negative               9.2    9.45  )-----------( 132      ( 06-07 @ 05:15 )             28.9                12.0   8.64  )-----------( 181      ( 06-06 @ 10:40 )             37.7                11.6   7.88  )-----------( 179      ( 06-05 @ 12:30 )             35.8         MEDICATIONS  (STANDING):  acetaminophen     Tablet .. 975 milliGRAM(s) Oral <User Schedule>  chlorhexidine 2% Cloths 1 Application(s) Topical daily  diphtheria/tetanus/pertussis (acellular) Vaccine (Adacel) 0.5 milliLiter(s) IntraMuscular once  heparin   Injectable 5000 Unit(s) SubCutaneous every 12 hours  ibuprofen  Tablet. 600 milliGRAM(s) Oral every 6 hours  lactated ringers. 1000 milliLiter(s) (75 mL/Hr) IV Continuous <Continuous>  morphine PF Spinal 0.1 milliGRAM(s) IntraThecal. once  oxytocin Infusion 333.333 milliUNIT(s)/Min (1000 mL/Hr) IV Continuous <Continuous>    MEDICATIONS  (PRN):  dexAMETHasone  Injectable 4 milliGRAM(s) IV Push every 6 hours PRN Nausea  diphenhydrAMINE 25 milliGRAM(s) Oral every 6 hours PRN Pruritus  lanolin Ointment 1 Application(s) Topical every 6 hours PRN Sore Nipples  magnesium hydroxide Suspension 30 milliLiter(s) Oral two times a day PRN Constipation  nalbuphine Injectable 2.5 milliGRAM(s) IV Push every 6 hours PRN Pruritus  naloxone Injectable 0.1 milliGRAM(s) IV Push every 3 minutes PRN For ANY of the following changes in patient status:  A. Breaths Per Minute LESS THAN 10, B. Oxygen saturation LESS THAN 90%, C. Sedation score of 6 for Stop After: 4 Times  ondansetron Injectable 4 milliGRAM(s) IV Push every 6 hours PRN Nausea  oxyCODONE    IR 5 milliGRAM(s) Oral every 3 hours PRN Moderate to Severe Pain (4-10)  oxyCODONE    IR 5 milliGRAM(s) Oral once PRN Moderate to Severe Pain (4-10)  simethicone 80 milliGRAM(s) Chew every 4 hours PRN Gas

## 2024-06-10 VITALS
SYSTOLIC BLOOD PRESSURE: 105 MMHG | DIASTOLIC BLOOD PRESSURE: 72 MMHG | TEMPERATURE: 99 F | HEART RATE: 73 BPM | RESPIRATION RATE: 19 BRPM | OXYGEN SATURATION: 98 %

## 2024-06-10 RX ADMIN — Medication 975 MILLIGRAM(S): at 14:34

## 2024-06-10 RX ADMIN — HEPARIN SODIUM 5000 UNIT(S): 5000 INJECTION INTRAVENOUS; SUBCUTANEOUS at 08:26

## 2024-06-10 RX ADMIN — Medication 600 MILLIGRAM(S): at 12:51

## 2024-06-10 RX ADMIN — Medication 975 MILLIGRAM(S): at 03:02

## 2024-06-10 RX ADMIN — Medication 600 MILLIGRAM(S): at 13:53

## 2024-06-10 RX ADMIN — Medication 975 MILLIGRAM(S): at 09:12

## 2024-06-10 RX ADMIN — Medication 975 MILLIGRAM(S): at 08:26

## 2024-06-10 RX ADMIN — Medication 600 MILLIGRAM(S): at 05:51

## 2024-06-10 RX ADMIN — Medication 600 MILLIGRAM(S): at 00:10

## 2024-06-10 RX ADMIN — Medication 600 MILLIGRAM(S): at 06:28

## 2024-06-10 RX ADMIN — Medication 975 MILLIGRAM(S): at 14:54

## 2024-06-10 RX ADMIN — Medication 975 MILLIGRAM(S): at 03:31

## 2024-06-10 NOTE — PROGRESS NOTE ADULT - SUBJECTIVE AND OBJECTIVE BOX
R1 Progress Note    27yo POD#4 s/p pLTCS for breech presentation. Patient seen and examined at bedside, no acute overnight events. No acute complaints, pain well controlled. Patient is ambulating, voiding, and tolerating regular diet. Passing flatus. Denies CP, SOB, N/V, HA, blurred vision, epigastric pain.    Vital Signs Last 24 Hours  T(C): 36.9 (06-09-24 @ 22:00), Max: 36.9 (06-09-24 @ 22:00)  HR: 90 (06-09-24 @ 22:00) (72 - 90)  BP: 119/69 (06-09-24 @ 22:00) (101/62 - 119/69)  RR: 18 (06-09-24 @ 22:00) (18 - 18)  SpO2: 100% (06-09-24 @ 22:00) (99% - 100%)    I&O's Summary      Physical Exam:  General: NAD  Abdomen: Soft, non-tender, non-distended, fundus firm  Incision: Pfannenstiel incision CDI, subcuticular suture closure  Pelvic: Lochia wnl    Labs:    Blood Type: O Positive  Antibody Screen: Negative  RPR: Negative               9.9    5.93  )-----------( 147      ( 06-09 @ 08:30 )             31.1                9.2    9.45  )-----------( 132      ( 06-07 @ 05:15 )             28.9                12.0   8.64  )-----------( 181      ( 06-06 @ 10:40 )             37.7         MEDICATIONS  (STANDING):  acetaminophen     Tablet .. 975 milliGRAM(s) Oral <User Schedule>  diphtheria/tetanus/pertussis (acellular) Vaccine (Adacel) 0.5 milliLiter(s) IntraMuscular once  heparin   Injectable 5000 Unit(s) SubCutaneous every 12 hours  ibuprofen  Tablet. 600 milliGRAM(s) Oral every 6 hours    MEDICATIONS  (PRN):  diphenhydrAMINE 25 milliGRAM(s) Oral every 6 hours PRN Pruritus  lanolin Ointment 1 Application(s) Topical every 6 hours PRN Sore Nipples  magnesium hydroxide Suspension 30 milliLiter(s) Oral two times a day PRN Constipation  oxyCODONE    IR 5 milliGRAM(s) Oral every 3 hours PRN Moderate to Severe Pain (4-10)  oxyCODONE    IR 5 milliGRAM(s) Oral once PRN Moderate to Severe Pain (4-10)  simethicone 80 milliGRAM(s) Chew every 4 hours PRN Gas

## 2024-06-10 NOTE — PROGRESS NOTE ADULT - ASSESSMENT
A/P: 27yo POD#3 s/p pLTCS for breech presentation.  Patient is stable and doing well post-operatively.      #postpartum  - Continue regular diet.  - Increase ambulation.  - Continue motrin, tylenol, oxycodone PRN for pain control.    - H/H 11.6/35.8->12.0/37.7->9.2/28.9->9.9/31.1   - Pt with bigger drop in hct than expected, CT A/P (): Postpartum uterus status post . No significant pelvic or abdominal wall hematoma.  - Discharge planning     Ching Parsons PGY-1  A/P: 29yo POD#4 s/p pLTCS for breech presentation.  Patient is stable and doing well post-operatively.      #postpartum  - Continue regular diet.  - Increase ambulation.  - Continue motrin, tylenol, oxycodone PRN for pain control.    - H/H 11.6/35.8->12.0/37.7->9.2/28.9->9.9/31.1   - Pt with bigger drop in hct than expected, CT A/P (): Postpartum uterus status post . No significant pelvic or abdominal wall hematoma.  - Discharge planning     Ching Parsons PGY-1

## 2024-06-10 NOTE — PROGRESS NOTE ADULT - ATTENDING COMMENTS
ATT:  Pt seen and evaluated by me. Agree with findings, assessment and plan documented in resident note. Pt would like to stay in hosp until Post op day 3. Stephanie Foote MD
Associate Chief of L & D ( late entry)     I have met this patient for the first time today.  She was admitted by Dr Garcia for c section and delivered by Dr Jarrett.  She receive her care with [facility]    OB Progress Note:  Delivery, POD#1    S: 27yo POD#1 s/p LTCS. Her pain is well controlled. She is tolerating a regular diet and passing flatus. Denies N/V. Denies CP/SOB/lightheadedness/dizziness.   She is ambulating without difficulty.   Voiding spontaneously.     O:   Vital Signs Last 24 Hrs  T(C): 36.2 (2024 10:00), Max: 36.9 (2024 22:00)  T(F): 97.1 (2024 10:00), Max: 98.4 (2024 22:00)  HR: 80 (2024 10:00) (67 - 89)  BP: 112/52 (2024 10:00) (94/80 - 127/76)  BP(mean): 78 (2024 18:00) (61 - 98)  RR: 18 (2024 10:00) (12 - 19)  SpO2: 99% (2024 10:00) (98% - 100%)    Parameters below as of 2024 10:00  Patient On (Oxygen Delivery Method): room air        Labs:  Blood type: O Positive  Rubella IgG: RPR: Negative                          9.2<L>   9.45 >-----------< 132<L>    (  @ 05:15 )             28.9<L>                        12.0   8.64 >-----------< 181    (  @ 10:40 )             37.7                        11.6   7.88 >-----------< 179    (  @ 12:30 )             35.8    24 @ 12:30      137  |  104  |  12  ----------------------------<  74  4.1   |  17<L>  |  0.54        Ca    9.1      2024 12:30      PE:  Abdomen: Mildly distended, appropriately tender  incision aquacell   Extremities: No erythema, trace edema    A/P: 27yo POD#1 s/p LTCS. for breech presentation GDMA2 and IUGR    - Continue regular diet.  - Increase ambulation.  - Continue Motrin, Tylenol, oxycodone PRN for pain control.  vs. continue PCEA for pain.  - F/u AM CBC    Tasneem Clem Chavez M.D., M.B.A., M.S.
Associate Chief of L & D ( late entry)    OB Progress Note:  Delivery, POD#4    S: 27yo POD#4 s/p LTCS.  Patient denies any complaints at this time     O:   Vital Signs Last 24 Hrs  T(C): 36.2 (2024 10:00), Max: 36.9 (2024 22:00)  T(F): 97.1 (2024 10:00), Max: 98.4 (2024 22:00)  HR: 80 (2024 10:00) (67 - 89)  BP: 112/52 (2024 10:00) (94/80 - 127/76)  BP(mean): 78 (2024 18:00) (61 - 98)  RR: 18 (2024 10:00) (12 - 19)  SpO2: 99% (2024 10:00) (98% - 100%)    Parameters below as of 2024 10:00  Patient On (Oxygen Delivery Method): room air        Labs:  Blood type: O Positive  Rubella IgG: RPR: Negative                          9.2<L>   9.45 >-----------< 132<L>    (  @ 05:15 )             28.9<L>                        12.0   8.64 >-----------< 181    (  @ 10:40 )             37.7                        11.6   7.88 >-----------< 179    (  @ 12:30 )             35.8    - @ 12:30      137  |  104  |  12  ----------------------------<  74  4.1   |  17<L>  |  0.54        Ca    9.1      2024 12:30      PE:  Abdomen: Mildly distended, appropriately tender  incision aquacell   Extremities: No erythema, trace edema    A/P: 27yo POD#4 s/p LTCS. for breech presentation GDMA2 and IUGR    -  patient is stable for discharge  will follow up with DR Cedric Felix M.D., M.B.A., M.S.

## 2024-06-13 ENCOUNTER — APPOINTMENT (OUTPATIENT)
Dept: ANTEPARTUM | Facility: CLINIC | Age: 29
End: 2024-06-13

## 2024-06-27 RX ORDER — HUMAN INSULIN 100 [IU]/ML
24 INJECTION, SUSPENSION SUBCUTANEOUS
Refills: 0 | DISCHARGE

## 2024-06-27 RX ORDER — HUMAN INSULIN 100 [IU]/ML
14 INJECTION, SUSPENSION SUBCUTANEOUS
Refills: 0 | DISCHARGE

## 2024-10-21 NOTE — OB PROVIDER H&P - NSHPPHYSICALEXAM_GEN_ALL_CORE
[facility]  PHYSICAL THERAPY  OUTPATIENT REHABILITATION - SPECIALIZED THERAPY SERVICES  [] PELVIC HEALTH EVALUATION  [x] DAILY NOTE  [] PROGRESS NOTE [] DISCHARGE NOTE  [x] ALAINA GUERRERO    Date: 10/21/2024  Patient Name:  Katerin Perry  : 1974  MRN: [id number]  CSN: [id number]    Referring Practitioner Joy Rey MD   Diagnosis Other specified disorders of muscle  Muscle weakness (generalized)  Low back pain, unspecified  Muscle spasm of back  Constipation  Lower abdominal pain, unspecified  Scar conditions and fibrosis of skin  Myalgia, unspecified site   Treatment Diagnosis M62.89 - Other Specified Disorders of Muscle  M62.81 - Muscle Weakness (Generalized)  M54.5 - Low Back Pain  M62.830 - Muscle Spasms of Back, K55.09 - Other Constipation, and R10.30 - Lower Abdominal Pain, Unspecified  L90.5 - Scar Conditions and Fibrosis of Skin  M79.10 - Myalgia, Unspecified Site   Date of Evaluation 24    Additional Pertinent History HTN, ectopic pregnancy , hysterectomy       Functional Outcome Measure Used Modified HUDSON   Functional Outcome Score 30/50 (24)    21/50 (24)      Insurance: Primary: Payor: [employer] /  /  / ,   Secondary: UMR   Authorization Information: No precert required   Visit # 4/15 for progress note (Reporting Period: 24 to     )   Visits Allowed: THE PATIENT WON AN APPEAL THROUGH HER  FOR 15 PT VISITS (NO DATE RANGE).  CPT CODES  [id number]  [id number]  [id number]  [id number]   Recertification Date: 24   Physician Follow-Up: Mid March   Physician Orders:    History of Present Illness: Pt is a 50 year old female s/p hysterectomy on 2/15/24. Pt reports that her muscles in her lower back feel very tight. She states that she can not get them to relax and it is very uncomfortable. States that she is sitting and laying a lot and feels like she is very stiff. Bathing and getting dressed both are uncomfortable for her with bending forward.  Vital Signs Last 24 Hrs  T(C): 36.4 (05 Jun 2024 12:12), Max: 36.4 (05 Jun 2024 12:12)  T(F): 97.5 (05 Jun 2024 12:12), Max: 97.5 (05 Jun 2024 12:12)  HR: 84 (06 Jun 2024 10:37) (84 - 90)  BP: 115/67 (06 Jun 2024 10:37) (96/66 - 115/67)  BP(mean): --  RR: 16 (05 Jun 2024 12:12) (16 - 16)  SpO2: 99% (05 Jun 2024 12:12) (99% - 99%)        SVE:  FHT: Baseline:  , moderate variability, + accels, - decels  [place]: q  Sono: Vertex Vital Signs Last 24 Hrs  T(C): 36.4 (05 Jun 2024 12:12), Max: 36.4 (05 Jun 2024 12:12)  T(F): 97.5 (05 Jun 2024 12:12), Max: 97.5 (05 Jun 2024 12:12)  HR: 84 (06 Jun 2024 10:37) (84 - 90)  BP: 115/67 (06 Jun 2024 10:37) (96/66 - 115/67)  BP(mean): --  RR: 16 (05 Jun 2024 12:12) (16 - 16)  SpO2: 99% (05 Jun 2024 12:12) (99% - 99%)        SVE:  FHT: Baseline:  135, moderate variability, + accels, - decels  [place]: q  Sono: Vertex Vital Signs Last 24 Hrs  T(C): 36.4 (05 Jun 2024 12:12), Max: 36.4 (05 Jun 2024 12:12)  T(F): 97.5 (05 Jun 2024 12:12), Max: 97.5 (05 Jun 2024 12:12)  HR: 84 (06 Jun 2024 10:37) (84 - 90)  BP: 115/67 (06 Jun 2024 10:37) (96/66 - 115/67)  BP(mean): --  RR: 16 (05 Jun 2024 12:12) (16 - 16)  SpO2: 99% (05 Jun 2024 12:12) (99% - 99%)    Gen: well appearing, NAD   Resp: Nl work of breathing   CV: RRR   Abd: Soft, nontender gravid   Ext: moving all spont     FHT: Baseline:  135, moderate variability, + accels, - decels  [place]: none  Sono: Breech Vital Signs Last 24 Hrs  T(C): 36.4 (05 Jun 2024 12:12), Max: 36.4 (05 Jun 2024 12:12)  T(F): 97.5 (05 Jun 2024 12:12), Max: 97.5 (05 Jun 2024 12:12)  HR: 84 (06 Jun 2024 10:37) (84 - 90)  BP: 115/67 (06 Jun 2024 10:37) (96/66 - 115/67)  BP(mean): --  RR: 16 (05 Jun 2024 12:12) (16 - 16)  SpO2: 99% (05 Jun 2024 12:12) (99% - 99%)    Gen: well appearing, NAD   Resp: Nl work of breathing   CV: RRR   Abd: Soft, nontender gravid   Ext: moving all spont     FHT: Baseline:  135, moderate variability, + accels, - decels  [place]: none  Sono: Complete breech

## 2025-01-09 ENCOUNTER — NON-APPOINTMENT (OUTPATIENT)
Age: 30
End: 2025-01-09

## 2025-04-01 NOTE — OB PROVIDER DELIVERY SUMMARY - NS_FETALCOMPOTHER_OBGYN_ALL_OB_FT
-Chronic, well-controlled on atorvastatin 40 mg.  ASCVD risk is 11.3%  -Continue atorvastatin 40 mg as it is being well-tolerated.        Oligohydramnios

## 2025-06-24 ENCOUNTER — TRANSCRIPTION ENCOUNTER (OUTPATIENT)
Age: 30
End: 2025-06-24